# Patient Record
Sex: FEMALE | Race: WHITE | Employment: FULL TIME | URBAN - METROPOLITAN AREA
[De-identification: names, ages, dates, MRNs, and addresses within clinical notes are randomized per-mention and may not be internally consistent; named-entity substitution may affect disease eponyms.]

---

## 2017-01-25 RX ORDER — PEN NEEDLE, DIABETIC 29 G X1/2"
NEEDLE, DISPOSABLE MISCELLANEOUS
Qty: 100 PEN NEEDLE | Refills: 10 | Status: SHIPPED | OUTPATIENT
Start: 2017-01-25 | End: 2017-03-20 | Stop reason: SDUPTHER

## 2017-01-30 ENCOUNTER — PATIENT MESSAGE (OUTPATIENT)
Dept: INTERNAL MEDICINE CLINIC | Age: 57
End: 2017-01-30

## 2017-01-31 NOTE — TELEPHONE ENCOUNTER
I spoke with patient by phone- she has new insurance and new deductible requirements. Pt instructed to find out preferred coverage for DM medication and we can perscribe by their preference. Pt is not out of medication. She understands and agrees. She will notify us of the new medications after speaking with her insurance.    Aleksandr Rhodes MD

## 2017-01-31 NOTE — TELEPHONE ENCOUNTER
----- Message from Ken Simmons LPN sent at 4/87/9148  1:32 PM EST -----  Regarding: FW: Prescription Question  Contact: [phone]      ----- Message -----     From: Dasia Benavidez     Sent: 1/30/2017  12:36 PM       To: [facility]  Subject: Prescription Question                            I sent a message on Thursday (1/26/17) morning regarding my Flexeril and Levemir and would like a response to that message. The Flexeril costs me approx. $249 and the Flexeril costs me approx. $600 and I cannot afford to refill it. I expect to have a call back to day to discuss this.     [facility]  [phone]

## 2017-02-27 RX ORDER — LIRAGLUTIDE 6 MG/ML
INJECTION SUBCUTANEOUS
Qty: 4 SYRINGE | Refills: 11 | Status: SHIPPED | OUTPATIENT
Start: 2017-02-27 | End: 2017-09-11 | Stop reason: SDUPTHER

## 2017-03-20 RX ORDER — PEN NEEDLE, DIABETIC 29 G X1/2"
NEEDLE, DISPOSABLE MISCELLANEOUS
Qty: 100 PEN NEEDLE | Refills: 10 | Status: SHIPPED | OUTPATIENT
Start: 2017-03-20 | End: 2017-04-21 | Stop reason: SDUPTHER

## 2017-03-21 RX ORDER — PEN NEEDLE, DIABETIC 29 G X1/2"
NEEDLE, DISPOSABLE MISCELLANEOUS
Qty: 100 PEN NEEDLE | Refills: 99 | Status: SHIPPED | OUTPATIENT
Start: 2017-03-21 | End: 2020-09-03

## 2017-03-21 NOTE — TELEPHONE ENCOUNTER
From: Xiang Talbert  To: Phyllis Tejeda MD  Sent: 3/20/2017 4:10 PM EDT  Subject: Medication Renewal Request    Original authorizing provider: MD Xiang Moscoso would like a refill of the following medications:  Insulin Needles, Disposable, 30 gauge x 1/3\" Phyllis Tejeda MD]  Insulin [place], Disposable, 31 gauge x 1/4\" ndle Phyllis Tejeda MD]    Preferred [facility]: [facility] [address]:  The 31 gauge needles I have been using for my Levemir and Victoza and I only have a few left. The refill on this script is not refillable until around April 27, 2017. The script for the 30 gauge needles has run out and needs to redone. I am reusing needs. I need this called/send in to [address]!! I also need someone to call me once this has been done! Thank you.

## 2017-04-04 DIAGNOSIS — E11.9 TYPE 2 DIABETES MELLITUS WITHOUT COMPLICATION (HCC): ICD-10-CM

## 2017-04-05 RX ORDER — INSULIN DETEMIR 100 [IU]/ML
INJECTION, SOLUTION SUBCUTANEOUS
Qty: 4 PEN | Refills: 4 | Status: SHIPPED | OUTPATIENT
Start: 2017-04-05 | End: 2017-04-21 | Stop reason: SDUPTHER

## 2017-04-21 ENCOUNTER — OFFICE VISIT (OUTPATIENT)
Dept: ENDOCRINOLOGY | Age: 57
End: 2017-04-21

## 2017-04-21 VITALS
SYSTOLIC BLOOD PRESSURE: 124 MMHG | HEIGHT: 64 IN | HEART RATE: 89 BPM | DIASTOLIC BLOOD PRESSURE: 71 MMHG | BODY MASS INDEX: 32.1 KG/M2 | WEIGHT: 188 LBS

## 2017-04-21 DIAGNOSIS — E11.9 TYPE 2 DIABETES MELLITUS WITHOUT COMPLICATION, WITHOUT LONG-TERM CURRENT USE OF INSULIN (HCC): Primary | ICD-10-CM

## 2017-04-21 DIAGNOSIS — I10 ESSENTIAL HYPERTENSION: ICD-10-CM

## 2017-04-21 DIAGNOSIS — E78.2 MIXED HYPERLIPIDEMIA: ICD-10-CM

## 2017-04-21 RX ORDER — CANAGLIFLOZIN 100 MG/1
100 TABLET, FILM COATED ORAL DAILY
COMMUNITY
Start: 2017-04-19 | End: 2017-06-16 | Stop reason: SDUPTHER

## 2017-04-21 NOTE — PROGRESS NOTES
Chief Complaint   Patient presents with    Diabetes     History of Present Illness: Franco Lopez is a 65 y.o. female presents for evaluation of diabetes. she has had diabetes since 2005. Also had gestational diabetes around age 41. Most recent A1c was 9.4 in December 2016. Diabetes related complications:  No microalbumin  No neuropathy sx. No known retinopathy. Current diabetes regimen:  Invokana 100 mg   Metformin 1000 mg twice daily  victoza 1.8 mg daily  Levemir 70 units at night    Glucoses:  . Likely avg around 200. Diet:  Brings log where she records foods, glucoses and exercise  Exercise improves glucoses and has her lowest values  Candy and desserts and mac and cheese lead to highest glucoses. Has these kinds of foods/snacks frequently. Weight:  Wt was about 140 lbs prior to marriage in her 25s. Now weighs 188. Lipids atorvastatin 20 mg    BP: losartan 100 mg    Social;  Works for [employer].         Past Medical History:   Diagnosis Date    Diabetes ([address]     High cholesterol     Hypercholesteremia     Hypertension      Past Surgical History:   Procedure Laterality Date    HX GYN      D&C and 2 c-sections     Current Outpatient Prescriptions   Medication Sig    Insulin Needles, Disposable, 31 gauge x 1/4\" ndle Take 1-3 times daily    NOVOFINE 30 30 gauge x 1/3\" USE TO INJECT VICTOZA ONCE DAILY    Insulin Needles, Disposable, 30 gauge x 1/3\" 0.3 ml subcutaneous daily    VICTOZA 3-KIMBERLY 0.6 mg/0.1 mL (18 mg/3 mL) sub-q pen DIAL AND INJECT 1.8 MG UNDER THE SKIN ONCE DAILY    insulin detemir (LEVEMIR FLEXTOUCH) 100 unit/mL (3 mL) inpn 0.62 mL by SubCUTAneous route nightly. Increase by 2 units every 2 nights until morning sugar is < 120 (Patient taking differently: 70 Units by SubCUTAneous route nightly.  Increase by 2 units every 2 nights until morning sugar is < 120)    chlorpheniramine-acetaminophen (CORICIDIN HBP COLD AND FLU) 2-325 mg tab Per pack instructions prn cough    atorvastatin (LIPITOR) 20 mg tablet Take 1 Tab by mouth daily.  nystatin-triamcinolone (MYCOLOG II) topical cream 1 Drop. Twice daily    losartan (COZAAR) 100 mg tablet Take 1 Tab by mouth daily.  omeprazole (PRILOSEC) 40 mg capsule Take 1 Cap by mouth daily.  metFORMIN (GLUCOPHAGE) 500 mg tablet Take 2 Tabs by mouth two (2) times daily (with meals).  econazole nitrate (SPECTAZOLE) 1 % topical cream Apply  to affected area two (2) times a day.  naproxen sodium (NAPROSYN) 220 mg tablet Take 220 mg by mouth two (2) times daily (with meals).  aspirin delayed-release 81 mg tablet Take  by mouth daily. No current facility-administered medications for this visit. No Known Allergies  Family History   Problem Relation Age of Onset    Heart Disease Mother     Cancer Other      colon ca     Social History     Social History    Marital status:      Spouse name: N/A    Number of children: N/A    Years of education: N/A     Occupational History    Not on file.      Social History Main Topics    Smoking status: Never Smoker    Smokeless tobacco: Not on file    Alcohol use No    Drug use: Not on file    Sexual activity: Not on file     Other Topics Concern    Not on file     Social History Narrative     Review of Systems:  - Constitutional Symptoms: no fevers, chills, weight loss  - Eyes: no blurry vision or double vision  - Cardiovascular: no chest pain or palpitations  - Respiratory: no cough or shortness of breath  - Gastrointestinal: no dysphagia or abdominal pain  - Musculoskeletal: no joint pains or weakness  - Integumentary: no rashes  - Neurological: no numbness, tingling, or headaches  - Psychiatric: no depression or anxiety  - Endocrine: no heat or cold intolerance, no polyuria or polydipsia    Physical Examination:  Visit Vitals    /71    Pulse 89    Ht 5' 4\" (1.626 m)    Wt 188 lb (85.3 kg)    BMI 32.27 kg/m2   -   - General: pleasant, no distress, - HEENT:No scleral/conjunctival injection, EOMI,MMM  - Cardiovascular: regular, normal rate  - Respiratory: normal effort  - Integumentary: no edema  - Neurological: alert and oriented  - Psychiatric: normal mood and affect    Data Reviewed:   Component      Latest Ref Rng & Units 12/3/2016 12/1/2016 9/8/2016 3/3/2016           5:45 PM 11:36 AM  3:13 PM  3:57 PM   Sodium      136 - 145 mmol/L 138      Potassium      3.5 - 5.1 mmol/L 3.6      Chloride      97 - 108 mmol/L 103      CO2      21 - 32 mmol/L 25      Anion gap      5 - 15 mmol/L 10      Glucose      65 - 100 mg/dL 151 (H)      BUN      6 - 20 MG/DL 10      Creatinine      0.55 - 1.02 MG/DL 0.82      BUN/Creatinine ratio      12 - 20   12      GFR est AA      >60 ml/min/1.73m2 >60      GFR est non-AA      >60 ml/min/1.73m2 >60      Calcium      8.5 - 10.1 MG/DL 9.3      Bilirubin, total      0.2 - 1.0 MG/DL 0.3      ALT      12 - 78 U/L 23      AST      15 - 37 U/L 14 (L)      Alk.  phosphatase      45 - 117 U/L 116      Protein, total      6.4 - 8.2 g/dL 8.2      Albumin      3.5 - 5.0 g/dL 3.8      Globulin      2.0 - 4.0 g/dL 4.4 (H)      A-G Ratio      1.1 - 2.2   0.9 (L)      Cholesterol, total      100 - 199 mg/dL    126   Triglyceride      0 - 149 mg/dL    139   HDL Cholesterol      >39 mg/dL    34 (L)   VLDL, calculated      5 - 40 mg/dL    28   LDL, calculated      0 - 99 mg/dL    64   ALBUMIN, URINE POC      Negative mg/L       CREATININE, URINE POC      mg/dL       Microalbumin/creat ratio (POC)      mg/g       Hemoglobin A1c, (calculated)      4.8 - 5.6 %  9.4 (H)     Estimated average glucose      mg/dL  223     Hemoglobin A1c (POC)      %   8.8      Component      Latest Ref Rng & Units 12/3/2015           4:00 PM   Sodium      136 - 145 mmol/L    Potassium      3.5 - 5.1 mmol/L    Chloride      97 - 108 mmol/L    CO2      21 - 32 mmol/L    Anion gap      5 - 15 mmol/L    Glucose      65 - 100 mg/dL    BUN      6 - 20 MG/DL    Creatinine 0.55 - 1.02 MG/DL    BUN/Creatinine ratio      12 - 20      GFR est AA      >60 ml/min/1.73m2    GFR est non-AA      >60 ml/min/1.73m2    Calcium      8.5 - 10.1 MG/DL    Bilirubin, total      0.2 - 1.0 MG/DL    ALT      12 - 78 U/L    AST      15 - 37 U/L    Alk. phosphatase      45 - 117 U/L    Protein, total      6.4 - 8.2 g/dL    Albumin      3.5 - 5.0 g/dL    Globulin      2.0 - 4.0 g/dL    A-G Ratio      1.1 - 2.2      Cholesterol, total      100 - 199 mg/dL    Triglyceride      0 - 149 mg/dL    HDL Cholesterol      >39 mg/dL    VLDL, calculated      5 - 40 mg/dL    LDL, calculated      0 - 99 mg/dL    ALBUMIN, URINE POC      Negative mg/L 30   CREATININE, URINE POC      mg/dL 100   Microalbumin/creat ratio (POC)      mg/g <30     Assessment/Plan:   1. Type 2 diabetes mellitus without complication, without long-term current use of insulin (HCC)   - control is good at times, especially when she is making dietary efforts, like she was near end of January.   - Reviewed pathology of type II diabetes, including insulin resistance and progressive loss of beta cell function and insulin production with time. Explained the role of weight loss and limiting carbohydrate intake in affecting insulin needs. Reviewed basal and prandial insulin needs. Reviewed handout and gave basic directions on carbohydrate content of foods. Recommended limiting carbohydrate intake to < 45 g with meals. - she has good insight and acknowledges that her poor diet and frequent intake of sweets and starches is the main obstacle to good diabetes control  Encouraged her to avoid carbohydrates and sweets for the most part. Reviewed how her medications work. Victoza should cover a reasonable amount of carbohydrates, but she frequently exceeds moderate consumption and glucoses spike  Recommended focused monitoring to determine which meals and foods work for her and which do not. Discussed how monitoring could help Levemir dose be adjusted. 2. BMI 32.0-32.9,adult   - she is likely 40-50 lbs above optimal weight for her. As noted above, reviewed how considerable weight loss would greatly improve her diabetes control and decrease medication needs. - encouraged her to avoid the extra, empty calories she is currently consuming.  - discussed how weight loss medications could be considered in future to help her with dietary efforts  - will reassess in 3 months. 3. Essential hypertension - controlled. Continue losartan   4. Mixed hyperlipidemia - continue atorvastatin   Greater than 50% of 45 minute visit was spent counseling the patient about above    Patient Instructions   Diabetes. - Avoid candy and sweets. Watch carbohydrate intake with meals and aim to keep this less than 45 grams per meal.    A Mediterranean style diet with 55% or less of calories from carbohydrates has been shown to be very helpful for people with diabetes. This diet consists of vegetables, whole fruit, nuts, whole grains, beans, lentils, olive oil, fish, chicken, and less refined grains, red and processed meats. Exercise: work up to 30 minutes 5 days weekly of walking, or any other activity that gets your heart rate up. Make sure you are getting enough sleep - at least 7 hours/night. Medications:  Victoza 1.8 mg daily  Invokana 100 mg   Metformin 1000 mg twice daily    Levemir 70 units at night  ** Follow trend from bedtime to fasting to assess    Goals for blood glucose:  Fasting  (less than 150)  Lunch, Dinner, Bedtime -  (less than 180). ** Decrease Levemir in 5-10 unit increments every 5-7 days for values < 90.      Work on weight loss and exercise efforts as well       Return in 3 months

## 2017-04-21 NOTE — MR AVS SNAPSHOT
Visit Information Date & Time Provider Department Dept. Phone Encounter #  
 4/21/2017  1:00 PM Hallie Wagner, [facility] [phone] [id number] Upcoming Health Maintenance Date Due  
 EYE EXAM RETINAL OR DILATED Q1 3/2/1971 DTaP/Tdap/Td series (1 - Tdap) 3/2/1982 BREAST CANCER SCRN MAMMOGRAM 3/2/2011 INFLUENZA AGE 9 TO ADULT 8/1/2016 FOOT EXAM Q1 12/3/2016 MICROALBUMIN Q1 12/3/2016 LIPID PANEL Q1 3/3/2017 FOBT Q 1 YEAR AGE 50-75 3/4/2017 HEMOGLOBIN A1C Q6M 6/1/2017 PAP AKA CERVICAL CYTOLOGY 3/2/2018 Allergies as of 4/21/2017  Review Complete On: 4/21/2017 By: Hallie Wagner MD  
 No Known Allergies Current Immunizations  Never Reviewed Name Date Influenza Vaccine 10/9/2014 Pneumococcal Vaccine (Unspecified Type) 10/9/2014 Not reviewed this visit Vitals BP Pulse Height(growth percentile) Weight(growth percentile) BMI OB Status 124/71 89 5' 4\" (1.626 m) 188 lb (85.3 kg) 32.27 kg/m2 Menopause Smoking Status Never Smoker Vitals History BMI and BSA Data Body Mass Index Body Surface Area  
 32.27 kg/m 2 1.96 m 2 Preferred Pharmacy Pharmacy Name Phone [facility] [address] [facility] [address] [facility] [phone] Your Updated Medication List  
  
   
This list is accurate as of: 4/21/17  1:56 PM.  Always use your most recent med list.  
  
  
  
  
 atorvastatin 20 mg tablet Commonly known as:  LIPITOR Take 1 Tab by mouth daily. chlorpheniramine-acetaminophen 2-325 mg Tab Commonly known as:  CORICIDIN HBP COLD AND FLU Per pack instructions prn cough  
  
 econazole nitrate 1 % topical cream  
Commonly known as:  Mitzi Brew Apply  to affected area two (2) times a day. insulin detemir 100 unit/mL (3 mL) Inpn Commonly known as:  Nolia Runner  
 0.62 mL by SubCUTAneous route nightly. Increase by 2 units every 2 nights until morning sugar is < 120 INVOKANA 100 mg tablet Generic drug:  canagliflozin Take 100 mg by mouth daily. losartan 100 mg tablet Commonly known as:  COZAAR Take 1 Tab by mouth daily. metFORMIN 500 mg tablet Commonly known as:  GLUCOPHAGE Take 2 Tabs by mouth two (2) times daily (with meals). naproxen sodium 220 mg tablet Commonly known as:  NAPROSYN Take 220 mg by mouth two (2) times daily (with meals). * NOVOFINE 30 30 gauge x 1/3\" Generic drug:  Insulin Needles (Disposable) USE TO INJECT VICTOZA ONCE DAILY * Insulin Needles (Disposable) 30 gauge x 1/3\"  
0.3 ml subcutaneous daily * Insulin Needles (Disposable) 31 gauge x 1/4\" Ndle Take 1-3 times daily  
  
 nystatin-triamcinolone topical cream  
Commonly known as:  MYCOLOG II  
1 Drop. Twice daily  
  
 omeprazole 40 mg capsule Commonly known as:  PRILOSEC Take 1 Cap by mouth daily. VICTOZA 3-KIMBERLY 0.6 mg/0.1 mL (18 mg/3 mL) sub-q pen Generic drug:  Liraglutide DIAL AND INJECT 1.8 MG UNDER THE SKIN ONCE DAILY * Notice: This list has 3 medication(s) that are the same as other medications prescribed for you. Read the directions carefully, and ask your doctor or other care provider to review them with you. Patient Instructions Diabetes. - Avoid candy and sweets. Watch carbohydrate intake with meals and aim to keep this less than 45 grams per meal. 
 
A Mediterranean style diet with 55% or less of calories from carbohydrates has been shown to be very helpful for people with diabetes. This diet consists of vegetables, whole fruit, nuts, whole grains, beans, lentils, olive oil, fish, chicken, and less refined grains, red and processed meats. Exercise: work up to 30 minutes 5 days weekly of walking, or any other activity that gets your heart rate up. Make sure you are getting enough sleep - at least 7 hours/night. Medications: 
Victoza 1.8 mg daily Invokana 100 mg  
Metformin 1000 mg twice daily Levemir 70 units at night 
** Follow trend from bedtime to fasting to assess Goals for blood glucose: 
Fasting  (less than 150) Lunch, Dinner, Bedtime -  (less than 180). ** Decrease Levemir in 5-10 unit increments every 5-7 days for values < 90. Work on weight loss and exercise efforts as well Introducing [facility]! Dear Yadira Mancia: Thank you for requesting a [employer] account. Our records indicate that you already have an active [employer] account. You can access your account anytime at [email or web address] "[employer] Did you know that you can access your hospital and ER discharge instructions at any time in [employer]? You can also review all of your test results from your hospital stay or ER visit. Additional Information If you have questions, please visit the Frequently Asked Questions section of the [employer] website at [email or web address] "[employer]/. Remember, [employer] is NOT to be used for urgent needs. For medical emergencies, dial 911. Now available from your iPhone and Android! Please provide this summary of care documentation to your next provider. Your primary care clinician is listed as Huy Moffett. If you have any questions after today's visit, please call [phone].

## 2017-04-21 NOTE — PATIENT INSTRUCTIONS
Diabetes. - Avoid candy and sweets. Watch carbohydrate intake with meals and aim to keep this less than 45 grams per meal.    A Mediterranean style diet with 55% or less of calories from carbohydrates has been shown to be very helpful for people with diabetes. This diet consists of vegetables, whole fruit, nuts, whole grains, beans, lentils, olive oil, fish, chicken, and less refined grains, red and processed meats. Exercise: work up to 30 minutes 5 days weekly of walking, or any other activity that gets your heart rate up. Make sure you are getting enough sleep - at least 7 hours/night. Medications:  Victoza 1.8 mg daily  Invokana 100 mg   Metformin 1000 mg twice daily    Levemir 70 units at night  ** Follow trend from bedtime to fasting to assess    Goals for blood glucose:  Fasting  (less than 150)  Lunch, Dinner, Bedtime -  (less than 180). ** Decrease Levemir in 5-10 unit increments every 5-7 days for values < 90.      Work on weight loss and exercise efforts as well

## 2017-06-16 RX ORDER — CANAGLIFLOZIN 100 MG/1
100 TABLET, FILM COATED ORAL DAILY
Qty: 90 TAB | Refills: 3 | Status: SHIPPED | OUTPATIENT
Start: 2017-06-16 | End: 2017-08-03 | Stop reason: ALTCHOICE

## 2017-07-01 DIAGNOSIS — I15.0 RENOVASCULAR HYPERTENSION: ICD-10-CM

## 2017-07-03 DIAGNOSIS — E11.9 TYPE 2 DIABETES MELLITUS WITHOUT COMPLICATION, UNSPECIFIED LONG TERM INSULIN USE STATUS: ICD-10-CM

## 2017-07-03 RX ORDER — METFORMIN HYDROCHLORIDE 500 MG/1
1000 TABLET ORAL 2 TIMES DAILY WITH MEALS
Qty: 360 TAB | Refills: 12 | Status: SHIPPED | OUTPATIENT
Start: 2017-07-03 | End: 2017-08-03 | Stop reason: ALTCHOICE

## 2017-07-03 RX ORDER — LOSARTAN POTASSIUM 100 MG/1
TABLET ORAL
Qty: 30 TAB | Refills: 3 | Status: SHIPPED | OUTPATIENT
Start: 2017-07-03

## 2017-07-30 ENCOUNTER — APPOINTMENT (OUTPATIENT)
Dept: GENERAL RADIOLOGY | Age: 57
End: 2017-07-30
Attending: EMERGENCY MEDICINE
Payer: COMMERCIAL

## 2017-07-30 ENCOUNTER — HOSPITAL ENCOUNTER (EMERGENCY)
Age: 57
Discharge: HOME | End: 2017-07-30
Attending: EMERGENCY MEDICINE
Payer: COMMERCIAL

## 2017-07-30 VITALS
RESPIRATION RATE: 14 BRPM | HEIGHT: 64 IN | WEIGHT: 178 LBS | BODY MASS INDEX: 30.39 KG/M2 | HEART RATE: 71 BPM | DIASTOLIC BLOOD PRESSURE: 80 MMHG | TEMPERATURE: 97.6 F | OXYGEN SATURATION: 100 % | SYSTOLIC BLOOD PRESSURE: 165 MMHG

## 2017-07-30 DIAGNOSIS — M65.9 TENOSYNOVITIS: Primary | ICD-10-CM

## 2017-07-30 PROCEDURE — 99283 EMERGENCY DEPT VISIT LOW MDM: CPT

## 2017-07-30 PROCEDURE — L3809 WHFO W/O JOINTS PRE OTS: HCPCS

## 2017-07-30 PROCEDURE — 73110 X-RAY EXAM OF WRIST: CPT

## 2017-07-30 RX ORDER — NAPROXEN 500 MG/1
500 TABLET ORAL 2 TIMES DAILY WITH MEALS
Qty: 20 TAB | Refills: 0 | Status: SHIPPED | OUTPATIENT
Start: 2017-07-30 | End: 2017-08-03

## 2017-07-30 NOTE — ED PROVIDER NOTES
HPI Comments: 65 y.o. female with past medical history significant for diabetes, HTN, hypercholesterolemia, high cholesterol, and hyperlipidemia who presents from home with chief complaint of hand pain. Patient states that she has had pain radiating from her left fifth finger down to her wrist. She also complains of some swelling to her left wrist. Patient states that the pain is exacerbated with exertion, especially typing. Patient reports that she has had this pain since Thursday (2 days ago). She denies any injury to the area or previous history of similar pain. There are no other acute medical concerns at this time. Social hx: former smoker, no EtOH use, no drug use  PCP: Cecile Mckeon MD    Note written by Nancy Good, as dictated by Muriel Ferrara MD 9:54 AM      The history is provided by the patient. No  was used. Past Medical History:   Diagnosis Date    Diabetes ([address]     High cholesterol     Hypercholesteremia     Hypertension     Ill-defined condition     hyperlipidemia       Past Surgical History:   Procedure Laterality Date    HX GYN      D&C and 2 c-sections         Family History:   Problem Relation Age of Onset    Heart Disease Mother      had CHF    Cancer Other      colon ca    Diabetes Brother        Social History     Social History    Marital status:      Spouse name: N/A    Number of children: N/A    Years of education: N/A     Occupational History    Not on file. Social History Main Topics    Smoking status: Former Smoker    Smokeless tobacco: Not on file    Alcohol use No    Drug use: No    Sexual activity: Not on file     Other Topics Concern    Not on file     Social History Narrative         ALLERGIES: Review of patient's allergies indicates no known allergies. Review of Systems   Constitutional: Negative for activity change, appetite change and fatigue.    HENT: Negative for ear pain, facial swelling, sore throat and trouble swallowing. Eyes: Negative for pain, discharge and visual disturbance. Respiratory: Negative for chest tightness, shortness of breath and wheezing. Cardiovascular: Negative for chest pain and palpitations. Gastrointestinal: Negative for abdominal pain, blood in stool, nausea and vomiting. Genitourinary: Negative for difficulty urinating, flank pain and hematuria. Musculoskeletal: Positive for arthralgias and joint swelling. Negative for myalgias and neck pain. Skin: Negative for color change and rash. Neurological: Negative for dizziness, weakness, numbness and headaches. Hematological: Negative for adenopathy. Does not bruise/bleed easily. Psychiatric/Behavioral: Negative for behavioral problems, confusion and sleep disturbance. All other systems reviewed and are negative. Vitals:    07/30/17 0934   BP: 165/80   Pulse: 71   Resp: 14   Temp: 97.6 °F (36.4 °C)   SpO2: 100%   Weight: 80.7 kg (178 lb)   Height: 5' 4\" (1.626 m)            Physical Exam   Constitutional: She is oriented to person, place, and time. She appears well-developed and well-nourished. No distress. HENT:   Head: Normocephalic and atraumatic. Nose: Nose normal.   Mouth/Throat: Oropharynx is clear and moist.   Eyes: Conjunctivae and EOM are normal. Pupils are equal, round, and reactive to light. No scleral icterus. Neck: Normal range of motion. Neck supple. No JVD present. No tracheal deviation present. No thyromegaly present. No carotid bruits noted. Cardiovascular: Normal rate, regular rhythm, normal heart sounds and intact distal pulses. Exam reveals no gallop and no friction rub. No murmur heard. Pulmonary/Chest: Effort normal and breath sounds normal. No respiratory distress. She has no wheezes. She has no rales. She exhibits no tenderness. Abdominal: Soft. Bowel sounds are normal. She exhibits no distension and no mass. There is no tenderness.  There is no rebound and no guarding. Musculoskeletal: Normal range of motion. She exhibits no edema. Left wrist: She exhibits swelling. Left hand: She exhibits tenderness (from base of fifth metacarpal, going up to 5th finger). Lymphadenopathy:     She has no cervical adenopathy. Neurological: She is alert and oriented to person, place, and time. She has normal reflexes. No cranial nerve deficit. Coordination normal.   Skin: Skin is warm and dry. No rash noted. No erythema. Psychiatric: She has a normal mood and affect. Her behavior is normal. Judgment and thought content normal.   Nursing note and vitals reviewed. Note written by Nancy Schmitt, as dictated by Rafaela Prado MD 9:54 AM    MDM  Number of Diagnoses or Management Options     Amount and/or Complexity of Data Reviewed  Tests in the radiology section of CPT®: ordered and reviewed  Decide to obtain previous medical records or to obtain history from someone other than the patient: yes  Review and summarize past medical records: yes  Independent visualization of images, tracings, or specimens: yes    Risk of Complications, Morbidity, and/or Mortality  Presenting problems: moderate  Diagnostic procedures: moderate  Management options: moderate    Patient Progress  Patient progress: stable    ED Course       Procedures  PROGRESS NOTE:  10:20 AM Will place patients left wrist in splint and instruct patient to use warm compresses. Patient will follow up with PCP if pain does not improve in 3-4 days. Suspect this is a tenosynovitis.

## 2017-07-30 NOTE — LETTER
[address] 
[address] [facility] [id number] 
[phone] Work/School Note Date: 7/30/2017 To Whom It May concern: 
 
Elif Short was seen and treated today in the emergency room by the following provider(s): 
Attending Provider: Jeannette Álvarez MD. Elif Short Return to work 8/2/17 Sincerely, Jeannette Álvarez MD

## 2017-08-03 ENCOUNTER — OFFICE VISIT (OUTPATIENT)
Dept: ENDOCRINOLOGY | Age: 57
End: 2017-08-03

## 2017-08-03 VITALS
BODY MASS INDEX: 30.39 KG/M2 | DIASTOLIC BLOOD PRESSURE: 79 MMHG | WEIGHT: 178 LBS | HEIGHT: 64 IN | HEART RATE: 78 BPM | SYSTOLIC BLOOD PRESSURE: 137 MMHG

## 2017-08-03 DIAGNOSIS — E11.9 TYPE 2 DIABETES MELLITUS WITHOUT COMPLICATION, UNSPECIFIED LONG TERM INSULIN USE STATUS: ICD-10-CM

## 2017-08-03 DIAGNOSIS — E11.9 TYPE 2 DIABETES MELLITUS WITHOUT COMPLICATION, WITH LONG-TERM CURRENT USE OF INSULIN (HCC): Primary | ICD-10-CM

## 2017-08-03 DIAGNOSIS — Z79.4 TYPE 2 DIABETES MELLITUS WITHOUT COMPLICATION, WITH LONG-TERM CURRENT USE OF INSULIN (HCC): Primary | ICD-10-CM

## 2017-08-03 DIAGNOSIS — I10 ESSENTIAL HYPERTENSION: ICD-10-CM

## 2017-08-03 DIAGNOSIS — E78.2 MIXED HYPERLIPIDEMIA: ICD-10-CM

## 2017-08-03 NOTE — MR AVS SNAPSHOT
Visit Information Date & Time Provider Department Dept. Phone Encounter #  
 8/3/2017  2:30 PM Rory Maddox, [facility] [phone] Follow-up Instructions Return in about 4 months (around 12/3/2017). Upcoming Health Maintenance Date Due  
 EYE EXAM RETINAL OR DILATED Q1 3/2/1971 DTaP/Tdap/Td series (1 - Tdap) 3/2/1982 BREAST CANCER SCRN MAMMOGRAM 3/2/2011 FOOT EXAM Q1 12/3/2016 FOBT Q 1 YEAR AGE 50-75 3/4/2017 HEMOGLOBIN A1C Q6M 6/1/2017 INFLUENZA AGE 9 TO ADULT 8/1/2017 PAP AKA CERVICAL CYTOLOGY 3/2/2018 MICROALBUMIN Q1 6/8/2018 LIPID PANEL Q1 6/8/2018 Allergies as of 8/3/2017  Review Complete On: 8/3/2017 By: Rory Maddox MD  
 No Known Allergies Current Immunizations  Never Reviewed Name Date Influenza Vaccine 10/9/2014 Pneumococcal Vaccine (Unspecified Type) 10/9/2014 Not reviewed this visit You Were Diagnosed With   
  
 Codes Comments Type 2 diabetes mellitus without complication, with long-term current use of insulin (HCC)    -  Primary ICD-10-CM: E11.9, Z79.4 ICD-9-CM: 250.00, V58.67 Essential hypertension     ICD-10-CM: I10 
ICD-9-CM: 401.9 Mixed hyperlipidemia     ICD-10-CM: E78.2 ICD-9-CM: 272.2 BMI 30.0-30.9,adult     ICD-10-CM: Z68.30 ICD-9-CM: V85.30 Vitals BP Pulse Height(growth percentile) Weight(growth percentile) BMI OB Status 137/79 78 5' 4\" (1.626 m) 178 lb (80.7 kg) 30.55 kg/m2 Menopause Smoking Status Former Smoker Vitals History BMI and BSA Data Body Mass Index Body Surface Area 30.55 kg/m 2 1.91 m 2 Preferred Pharmacy Pharmacy Name Phone [facility] [address] [place] [address] [address] [facility] [phone] Your Updated Medication List  
  
   
This list is accurate as of: 8/3/17  3:36 PM.  Always use your most recent med list.  
  
  
  
  
 atorvastatin 20 mg tablet Commonly known as:  LIPITOR Take 1 Tab by mouth daily. canagliflozin-metformin 150-1,000 mg Tbph  
Commonly known as:  INVOKAMET XR Take 2 Each by mouth daily. econazole nitrate 1 % topical cream  
Commonly known as:  Nataly Meals Apply  to affected area two (2) times a day. insulin detemir 100 unit/mL (3 mL) Inpn Commonly known as:  LEVEMIR FLEXTOUCH  
0.62 mL by SubCUTAneous route nightly. Increase by 2 units every 2 nights until morning sugar is < 120  
  
 losartan 100 mg tablet Commonly known as:  COZAAR  
TAKE ONE TABLET BY MOUTH DAILY  
  
 naproxen sodium 220 mg tablet Commonly known as:  NAPROSYN Take 220 mg by mouth two (2) times daily (with meals). * NOVOFINE 30 30 gauge x 1/3\" Generic drug:  Insulin Needles (Disposable) USE TO INJECT VICTOZA ONCE DAILY * Insulin Needles (Disposable) 30 gauge x 1/3\"  
0.3 ml subcutaneous daily * Insulin Needles (Disposable) 31 gauge x 1/4\" Ndle Take 1-3 times daily  
  
 nystatin-triamcinolone topical cream  
Commonly known as:  MYCOLOG II  
1 Drop. Twice daily  
  
 omeprazole 40 mg capsule Commonly known as:  PRILOSEC Take 1 Cap by mouth daily. phentermine-topiramate 7.5-46 mg Cm24 Commonly known as:  QSYMIA Take 1 Each by mouth daily. Max Daily Amount: 1 Each. VICTOZA 3-KIMBERLY 0.6 mg/0.1 mL (18 mg/3 mL) sub-q pen Generic drug:  Liraglutide DIAL AND INJECT 1.8 MG UNDER THE SKIN ONCE DAILY * Notice: This list has 3 medication(s) that are the same as other medications prescribed for you. Read the directions carefully, and ask your doctor or other care provider to review them with you. Prescriptions Printed Refills  
 phentermine-topiramate (QSYMIA) 7.5-46 mg CM24 5 Sig: Take 1 Each by mouth daily. Max Daily Amount: 1 Each. Class: Print Route: Oral  
  
Prescriptions Sent to Pharmacy Refills canagliflozin-metformin (INVOKAMET XR) 150-1,000 mg TBph 10 Sig: Take 2 Each by mouth daily. Class: Normal  
 Pharmacy: [facility] Michael Morales [facility] Ph #: [phone] Route: Oral  
  
Follow-up Instructions Return in about 4 months (around 12/3/2017). Patient Instructions Diabetes. Continue dietary, exercise and weight loss efforts Watch carbohydrate intake with meals and aim to keep this less than 45 grams per meal. 
 
Medications: 
Victoza 1.8 mg daily Combine metformin and Invokana as Invokamet /1000 x 2 once daily Levemir 65 +/- units at night 
** Follow trend from bedtime to fasting to assess ** should need less if your diet improves and you lose weight. Goals for blood glucose: 
Fasting  (less than 150) Lunch, Dinner, Bedtime -  (less than 180). ** Decrease Levemir in 5-10 unit increments every 5-7 days for values < 90.  
 
Weight: - Qsymia 7.5 mg/46 mg.  IF you would like, we can increase to 11mg dose in 1 month. Highest dose is 15 mg daily/96 mg dose Potential side effects:  
Phentermine - Take early in the morning. Let me know if you have any problems with increase heart rate, blood pressure, anxiety or trouble sleeping as these can be side effects. If symptoms are mild, you body should acclimate to this and any related symptoms should improve. 
  
Topiramate - Side effect may be sleepiness. Also, if you notice any changes in your thinking, then decrease medication or stop. Let me know if you have questions Introducing [facility]! Dear Riccardo Ferro: Thank you for requesting a nWay account. Our records indicate that you already have an active nWay account. You can access your account anytime at [email or web address] Did you know that you can access your hospital and ER discharge instructions at any time in nWay?   You can also review all of your test results from your hospital stay or ER visit. Additional Information If you have questions, please visit the Frequently Asked Questions section of the [employer] website at [email or web address] Remember, [employer] is NOT to be used for urgent needs. For medical emergencies, dial 911. Now available from your iPhone and Android! Please provide this summary of care documentation to your next provider. Your primary care clinician is listed as Alexander Fitch. If you have any questions after today's visit, please call [phone].

## 2017-08-03 NOTE — PATIENT INSTRUCTIONS
Diabetes. Continue dietary, exercise and weight loss efforts  Watch carbohydrate intake with meals and aim to keep this less than 45 grams per meal.    Medications:  Victoza 1.8 mg daily  Combine metformin and Invokana as Invokamet /1000 x 2 once daily    Levemir 65 +/- units at night  ** Follow trend from bedtime to fasting to assess  ** should need less if your diet improves and you lose weight. Goals for blood glucose:  Fasting  (less than 150)  Lunch, Dinner, Bedtime -  (less than 180). ** Decrease Levemir in 5-10 unit increments every 5-7 days for values < 90.     Weight:  - Qsymia 7.5 mg/46 mg.  IF you would like, we can increase to 11mg dose in 1 month. Highest dose is 15 mg daily/96 mg dose    Potential side effects:   Phentermine -   Take early in the morning. Let me know if you have any problems with increase heart rate, blood pressure, anxiety or trouble sleeping as these can be side effects. If symptoms are mild, you body should acclimate to this and any related symptoms should improve.     Topiramate -   Side effect may be sleepiness. Also, if you notice any changes in your thinking, then decrease medication or stop.    Let me know if you have questions

## 2017-08-03 NOTE — PROGRESS NOTES
History of Present Illness: Dann Fields is a 65 y.o. female presents for follow-up of diabetes. she has had diabetes since 2005. Also had gestational diabetes around age 41. She also has dyslipidemia, HTN and BMI of 30. Diabetes related complications:  No microalbumin  No neuropathy sx. No known retinopathy. Current diabetes regimen:  Invokana 100 mg   Metformin 1000 mg twice daily  victoza 1.8 mg daily  Levemir -taking 65 units +/- in evenings. Weight is down about 10 lbs from 2017 visit. A1c was 8.8 in 2017, down from 9.4 in 2016    Glucoses:  Brings meter: Av at breakfast, 139 at lunch, 173 at dinner    Diet:  Dinner and fasting values are variable and can be high if she has too much starch for lunch or dinner, respectively. Weight:  Wt was about 140 lbs prior to marriage in her 25s, was as high as 190s and is now 178, down from 188 lb at last visit. Lipids atorvastatin 20 mg    BP: losartan 100 mg. 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       Past Medical History:   Diagnosis Date    Diabetes ([address]     High cholesterol     Hypercholesteremia     Hypertension     Ill-defined condition     hyperlipidemia     Current Outpatient Prescriptions   Medication Sig    losartan (COZAAR) 100 mg tablet TAKE ONE TABLET BY MOUTH DAILY    metFORMIN (GLUCOPHAGE) 500 mg tablet Take 2 Tabs by mouth two (2) times daily (with meals).  INVOKANA 100 mg tablet Take 1 Tab by mouth daily.  Insulin Needles, Disposable, 31 gauge x 1/4\" ndle Take 1-3 times daily    NOVOFINE 30 30 gauge x 1/3\" USE TO INJECT VICTOZA ONCE DAILY    Insulin Needles, Disposable, 30 gauge x 1/3\" 0.3 ml subcutaneous daily    VICTOZA 3-KIMBERLY 0.6 mg/0.1 mL (18 mg/3 mL) sub-q pen DIAL AND INJECT 1.8 MG UNDER THE SKIN ONCE DAILY    insulin detemir (LEVEMIR FLEXTOUCH) 100 unit/mL (3 mL) inpn 0.62 mL by SubCUTAneous route nightly.  Increase by 2 units every 2 nights until morning sugar is < 120 (Patient taking differently: 70 Units by SubCUTAneous route nightly. Increase by 2 units every 2 nights until morning sugar is < 120)    atorvastatin (LIPITOR) 20 mg tablet Take 1 Tab by mouth daily.  nystatin-triamcinolone (MYCOLOG II) topical cream 1 Drop. Twice daily    omeprazole (PRILOSEC) 40 mg capsule Take 1 Cap by mouth daily.  econazole nitrate (SPECTAZOLE) 1 % topical cream Apply  to affected area two (2) times a day.  naproxen sodium (NAPROSYN) 220 mg tablet Take 220 mg by mouth two (2) times daily (with meals).  naproxen (NAPROSYN) 500 mg tablet Take 1 Tab by mouth two (2) times daily (with meals) for 10 days.  chlorpheniramine-acetaminophen (CORICIDIN HBP COLD AND FLU) 2-325 mg tab Per pack instructions prn cough     No current facility-administered medications for this visit. No Known Allergies    Review of Systems:  - Eyes: no blurry vision or double vision  - Cardiovascular: no chest pain  - Respiratory: no shortness of breath  - Musculoskeletal: no myalgias  - Neurological: no numbness/tingling in extremities    Physical Examination:  Visit Vitals    /79    Pulse 78    Ht 5' 4\" (1.626 m)    Wt 178 lb (80.7 kg)    BMI 30.55 kg/m2   -   - General: pleasant, no distress, normal gait   HEENT: hearing intact, EOMI, clear sclera without icterus  - Cardiovascular: regular, normal rate   - Respiratory: normal effort  - Integumentary: no edema  - Psychiatric: normal mood and affect    Data Reviewed:   Labs from 6/8/2017  A1c 8.8  Creatinine 0.7, GFR > 60, AST and ALT normal  Chol 145, triglycerides 186, HDL 36, LDL 72  Microalbumin/creatinine ratio: 13    Assessment/Plan:   1. Type 2 diabetes mellitus without complication, with long-term current use of insulin ([address]   - resent control is indicative of A1c of about 7.3, so overall control is much better. Still has some spikes when she eats too much starch or sweets.   - Discussed how pioglitazone would likely be a good fit for her. Will consider this at next visit. Will try a weight loss medication for the time being to see about further improving dietary efforts and weight  - continue Levemir 65 units +/- and decrease in 5-10 unit increments for values < 90  - change Invokana + metformin to Invokamet /1000 x 2 daily     2. Essential hypertension   - acceptable control  - continue losartan   3. Mixed hyperlipidemia   - continue atorvastatin  - weight loss and/or pioglitazone would help lower triglycerides and raise HDL   4. BMI 30.0-30.9,adult   - Exacerbating diabetes, HTN, dyslipidemia  - will have her start Qsymia 7.5/46 mg tablet once daily. Reviewed how this works, discussed individual components and reviewed side effects. If tolerating, can increase to 11.25 mg dose in 1 month     Patient Instructions   Diabetes. Continue dietary, exercise and weight loss efforts  Watch carbohydrate intake with meals and aim to keep this less than 45 grams per meal.    Medications:  Victoza 1.8 mg daily  Combine metformin and Invokana as Invokamet /1000 x 2 once daily    Levemir 65 +/- units at night  ** Follow trend from bedtime to fasting to assess  ** should need less if your diet improves and you lose weight. Goals for blood glucose:  Fasting  (less than 150)  Lunch, Dinner, Bedtime -  (less than 180). ** Decrease Levemir in 5-10 unit increments every 5-7 days for values < 90.     Weight:  - Qsymia 7.5 mg/46 mg.  IF you would like, we can increase to 11mg dose in 1 month. Highest dose is 15 mg daily/96 mg dose    Potential side effects:   Phentermine -   Take early in the morning. Let me know if you have any problems with increase heart rate, blood pressure, anxiety or trouble sleeping as these can be side effects. If symptoms are mild, you body should acclimate to this and any related symptoms should improve.     Topiramate -   Side effect may be sleepiness.  Also, if you notice any changes in your thinking, then decrease medication or stop. Let me know if you have questions           Follow-up Disposition:  Return in about 4 months (around 12/3/2017).     Copy sent to:

## 2017-08-08 ENCOUNTER — TELEPHONE (OUTPATIENT)
Dept: ENDOCRINOLOGY | Age: 57
End: 2017-08-08

## 2017-08-08 NOTE — TELEPHONE ENCOUNTER
8/8/2017  11:42 AM      Ms.  Leander Marlon called stating that a prior authorization is needed for     -phentermine-topiramate (QSYMIA) 7.5-46 mg       Thanks

## 2017-08-14 ENCOUNTER — TELEPHONE (OUTPATIENT)
Dept: ENDOCRINOLOGY | Age: 57
End: 2017-08-14

## 2017-08-14 RX ORDER — TOPIRAMATE 25 MG/1
25 TABLET ORAL 2 TIMES DAILY
Qty: 60 TAB | Refills: 10 | Status: SHIPPED | OUTPATIENT
Start: 2017-08-14 | End: 2017-12-14

## 2017-08-14 NOTE — TELEPHONE ENCOUNTER
Phentermine 8 mg =  Lomaira . This was released last year. It is branded, but somewhat inexpensive - $0.5 per pill. I have other patients taking this.  It should be available    Please see if she can look this up as Lomaira 8 mg tablet  Thank you

## 2017-08-14 NOTE — TELEPHONE ENCOUNTER
Tara Martin from [address] [place] [address] called in regards to Ms. Mcdonald medication (Phentermine) Tara Martin says that they dont make (Phentermine) 8mg anymore and would like to know what to do next.  You can reach Abena at [phone]

## 2017-09-11 RX ORDER — LIRAGLUTIDE 6 MG/ML
INJECTION SUBCUTANEOUS
Qty: 3 ADJUSTABLE DOSE PRE-FILLED PEN SYRINGE | Refills: 2 | Status: SHIPPED | OUTPATIENT
Start: 2017-09-11 | End: 2017-09-11 | Stop reason: SDUPTHER

## 2017-12-14 ENCOUNTER — OFFICE VISIT (OUTPATIENT)
Dept: ENDOCRINOLOGY | Age: 57
End: 2017-12-14

## 2017-12-14 VITALS
SYSTOLIC BLOOD PRESSURE: 143 MMHG | HEART RATE: 95 BPM | BODY MASS INDEX: 30.39 KG/M2 | DIASTOLIC BLOOD PRESSURE: 77 MMHG | HEIGHT: 64 IN | WEIGHT: 178 LBS

## 2017-12-14 DIAGNOSIS — E11.9 TYPE 2 DIABETES MELLITUS WITHOUT COMPLICATION, WITH LONG-TERM CURRENT USE OF INSULIN (HCC): Primary | ICD-10-CM

## 2017-12-14 DIAGNOSIS — I10 ESSENTIAL HYPERTENSION: ICD-10-CM

## 2017-12-14 DIAGNOSIS — E78.2 MIXED HYPERLIPIDEMIA: ICD-10-CM

## 2017-12-14 DIAGNOSIS — Z79.4 TYPE 2 DIABETES MELLITUS WITHOUT COMPLICATION, WITH LONG-TERM CURRENT USE OF INSULIN (HCC): Primary | ICD-10-CM

## 2017-12-14 NOTE — PROGRESS NOTES
History of Present Illness: Nanci Jeter is a 65 y.o. female presents for follow-up of diabetes. she has had diabetes since 2005. Also had gestational diabetes around age 41. She also has dyslipidemia, HTN and BMI of 30. Diabetes related complications:  No microalbumin  No neuropathy sx. No known retinopathy. Current diabetes regimen:  Invokana 100 mg   Metformin 1000 mg twice daily  victoza 1.8 mg daily  Levemir -taking 65 units  in evenings. -*She will be losing her insurance at the end of the year. She will likely apply for patient assistance for these expensive medications. Weight is down about 10 lbs from 4/2017 visit and has been stable since. A1c is increased to 9.4.,  This is up from 8.8 in June 2017, and similar to 9.4 in 12/2016    Glucoses:  Glucose meter reviewed. She has checking it at varying times-before breakfast, lunch and dinner. She infrequently checks before dinner. Fasting values can range from 104 to 300s. Values in the middle of the day and evening are often in the mid 100s. Diet:  Eating more sweets. Weight: Stable from last visit. BMI is 30.5 and weight is about 40 pounds higher than it was when she was in her 25s. She seems motivated to make dietary changes. Her daughter has lost 50 pounds with dietary changes. Her daughter is home for [employer] vacation. Mrs. Geraldo Tesfaye asks about the [employer]. Lipids atorvastatin 20 mg    BP: losartan 100 mg. BP higher today. She does not monitor at home. Social;  Works for periodontist.       Past Medical History:   Diagnosis Date    Diabetes ([address]     High cholesterol     Hypercholesteremia     Hypertension     Ill-defined condition     hyperlipidemia     Current Outpatient Prescriptions   Medication Sig    Liraglutide (VICTOZA 3-KIMBERLY) 0.6 mg/0.1 mL (18 mg/3 mL) pnij 1.8 mg by SubCUTAneous route daily.     canagliflozin-metformin (INVOKAMET XR) 150-1,000 mg TBph Take 2 Each by mouth daily. (Patient taking differently: Take 1 Each by mouth daily.)    insulin detemir (LEVEMIR FLEXTOUCH) 100 unit/mL (3 mL) inpn 70 Units by SubCUTAneous route nightly.  losartan (COZAAR) 100 mg tablet TAKE ONE TABLET BY MOUTH DAILY    Insulin Needles, Disposable, 31 gauge x 1/4\" ndle Take 1-3 times daily    NOVOFINE 30 30 gauge x 1/3\" USE TO INJECT VICTOZA ONCE DAILY    Insulin Needles, Disposable, 30 gauge x 1/3\" 0.3 ml subcutaneous daily    atorvastatin (LIPITOR) 20 mg tablet Take 1 Tab by mouth daily.  omeprazole (PRILOSEC) 40 mg capsule Take 1 Cap by mouth daily.  econazole nitrate (SPECTAZOLE) 1 % topical cream Apply  to affected area two (2) times a day.  phentermine 8 mg tab Take 16 mg by mouth daily. Max Daily Amount: 16 mg. Increase dose gradually as directed    topiramate (TOPAMAX) 25 mg tablet Take 1 Tab by mouth two (2) times a day.  nystatin-triamcinolone (MYCOLOG II) topical cream 1 Drop. Twice daily    naproxen sodium (NAPROSYN) 220 mg tablet Take 220 mg by mouth two (2) times daily (with meals). No current facility-administered medications for this visit. No Known Allergies    Review of Systems:  - Eyes: no blurry vision or double vision  - Cardiovascular: no chest pain  - Respiratory: no shortness of breath  - Musculoskeletal: no myalgias  - Neurological: no numbness/tingling in extremities. She does experience some pain in her left arch.     Physical Examination:  Visit Vitals    /77    Pulse 95    Ht 5' 4\" (1.626 m)    Wt 178 lb (80.7 kg)    BMI 30.55 kg/m2   -   - General: pleasant, no distress, normal gait   HEENT: hearing intact, EOMI, clear sclera without icterus  - Cardiovascular: regular, normal rate   - Respiratory: normal effort  - Integumentary: no edema   Diabetic foot exam:     Left: Filament test normal sensation with micro filament   Pulse DP: 2+ (normal)   Deformities: Mild -flatfeet  - Psychiatric: normal mood and affect    Data Reviewed: CMP normal  Hemoglobin A1c 9.4  Cholesterol 134, triglycerides 111, HDL 43, LDL 69    Assessment/Plan:   1. Type 2 diabetes mellitus without complication, with long-term current use of insulin (HCC)   Not controlled. She admits this is largely due to not adhering to her diet. She does ask about the diabetes reversal diet. I shared with her a handout I downloaded from Dr. Yari Cho discussing the diabetes reversal diet. Provided her with a handout summarizing available replacement drinks which are higher in protein and lowering carbohydrates and have less than 230 anh.  Reviewed that if she were to try this, she would need to follow her glucoses very closely and preventively decrease Levemir substantially. Reviewed with her the pathology of type 2 diabetes and how excess calorie intake needs to fat deposition in the liver and pancreas, but substantial calorie restriction can reverse this process. If she lost 30 pounds, she would likely not need insulin and may need relatively few medications. We also discussed her insurance ending at the new year. Will fill out patient assistance forms, if she qualifies. As her glucoses are well controlled at times, I do agree with her that her diet is the main limiting factor and current regimen should be able to control her glucoses. Continuing the,, metformin, Victoza, Levemir. 2. BMI 30.0-30.9,adult   -See above. -Reviewed with her how weight loss would result in improvements to energy, mood, sleep, cholesterol, blood pressure, diabetes. Discussed how she would have less health-related problems in the future if she were to have success losing weight. 3. Mixed hyperlipidemia   Continue atorvastatin weight loss efforts   4. Essential hypertension   Recommended she monitor blood pressure at home. Recommended a brachial blood pressure monitor. Reviewed how goal would be less than 146 systolic. Continue losartan.    Greater than 50% of 40 minute visit was spent counseling the patient about above. Patient Instructions   Diabetes. Continue dietary, exercise and weight loss efforts  Watch carbohydrate intake with meals and aim to keep this less than 45 grams per meal.  Weight loss would help greatly to lower insulin needs and medications needs. Lower calorie diet and weight loss would help to reduce medication needs. Would lower Levemir dose aggressively if you dramatically changes diet. Medications:  Victoza 1.8 mg daily  Combine metformin and Invokana    Levemir 65 +/- units at night  ** Follow trend from bedtime to fasting to assess  ** should need less if your diet improves and you lose weight. Goals for blood glucose:  Fasting  (less than 150)  Lunch, Dinner, Bedtime -  (less than 180). ** Decrease Levemir in 5-10 unit increments every 5-7 days for values < 90. Follow-up Disposition:  Return in about 4 months (around 4/14/2018).     Copy sent to:

## 2017-12-14 NOTE — MR AVS SNAPSHOT
Visit Information Date & Time Provider Department Dept. Phone Encounter #  
 12/14/2017  2:10 PM Edu Leary, [facility] Diabetes and Endocrinology [phone] Follow-up Instructions Return in about 4 months (around 4/14/2018). Upcoming Health Maintenance Date Due  
 EYE EXAM RETINAL OR DILATED Q1 3/2/1971 DTaP/Tdap/Td series (1 - Tdap) 3/2/1982 FOOT EXAM Q1 12/3/2016 FOBT Q 1 YEAR AGE 50-75 3/4/2017 Influenza Age 5 to Adult 8/1/2017 HEMOGLOBIN A1C Q6M 12/8/2017 PAP AKA CERVICAL CYTOLOGY 3/2/2018 MICROALBUMIN Q1 6/8/2018 LIPID PANEL Q1 6/8/2018 Allergies as of 12/14/2017  Review Complete On: 12/14/2017 By: Edu Leary MD  
 No Known Allergies Current Immunizations  Never Reviewed Name Date Influenza Vaccine 10/9/2014 Pneumococcal Vaccine (Unspecified Type) 10/9/2014 Not reviewed this visit You Were Diagnosed With   
  
 Codes Comments Type 2 diabetes mellitus without complication, with long-term current use of insulin (HCC)    -  Primary ICD-10-CM: E11.9, Z79.4 ICD-9-CM: 250.00, V58.67 BMI 30.0-30.9,adult     ICD-10-CM: Z68.30 ICD-9-CM: V85.30 Mixed hyperlipidemia     ICD-10-CM: E78.2 ICD-9-CM: 272.2 Essential hypertension     ICD-10-CM: I10 
ICD-9-CM: 401.9 Vitals BP Pulse Height(growth percentile) Weight(growth percentile) BMI OB Status 143/77 95 5' 4\" (1.626 m) 178 lb (80.7 kg) 30.55 kg/m2 Menopause Smoking Status Former Smoker Vitals History BMI and BSA Data Body Mass Index Body Surface Area 30.55 kg/m 2 1.91 m 2 Preferred Pharmacy Pharmacy Name Phone [facility] [place] [phone] [address] [facility] [address] [facility] [phone] Your Updated Medication List  
  
   
This list is accurate as of: 12/14/17  3:57 PM.  Always use your most recent med list.  
  
  
  
  
 atorvastatin 20 mg tablet Commonly known as:  LIPITOR Take 1 Tab by mouth daily. canagliflozin-metformin 150-1,000 mg Tbph  
Commonly known as:  INVOKAMET XR Take 2 Each by mouth daily. econazole nitrate 1 % topical cream  
Commonly known as:  Peola Oly Apply  to affected area two (2) times a day. insulin detemir 100 unit/mL (3 mL) Inpn Commonly known as:  LEVEMIR FLEXTOUCH  
70 Units by SubCUTAneous route nightly. Liraglutide 0.6 mg/0.1 mL (18 mg/3 mL) Pnij Commonly known as:  VICTOZA 3-KIMBERLY  
1.8 mg by SubCUTAneous route daily. losartan 100 mg tablet Commonly known as:  COZAAR  
TAKE ONE TABLET BY MOUTH DAILY  
  
 * NOVOFINE 30 30 gauge x 1/3\" Generic drug:  Insulin Needles (Disposable) USE TO INJECT VICTOZA ONCE DAILY * Insulin Needles (Disposable) 30 gauge x 1/3\"  
0.3 ml subcutaneous daily * Insulin Needles (Disposable) 31 gauge x 1/4\" Ndle Take 1-3 times daily  
  
 omeprazole 40 mg capsule Commonly known as:  PRILOSEC Take 1 Cap by mouth daily. * Notice: This list has 3 medication(s) that are the same as other medications prescribed for you. Read the directions carefully, and ask your doctor or other care provider to review them with you. Follow-up Instructions Return in about 4 months (around 4/14/2018). Patient Instructions Diabetes. Continue dietary, exercise and weight loss efforts Watch carbohydrate intake with meals and aim to keep this less than 45 grams per meal. 
Weight loss would help greatly to lower insulin needs and medications needs. Lower calorie diet and weight loss would help to reduce medication needs. Would lower Levemir dose aggressively if you dramatically changes diet. Medications: 
Victoza 1.8 mg daily Combine metformin and Invokana Levemir 65 +/- units at night 
** Follow trend from bedtime to fasting to assess ** should need less if your diet improves and you lose weight. Goals for blood glucose: Fasting  (less than 150) Lunch, Dinner, Bedtime -  (less than 180). ** Decrease Levemir in 5-10 unit increments every 5-7 days for values < 90. Introducing [facility]! Dear Lucille Berrios: Thank you for requesting a [email or web address] account. Our records indicate that you already have an active [email or web address] account. You can access your account anytime at [email or web address] [email or web address] Camp Did you know that you can access your hospital and ER discharge instructions at any time in [email or web address]? You can also review all of your test results from your hospital stay or ER visit. Additional Information If you have questions, please visit the Frequently Asked Questions section of the [email or web address] website at [email or web address] Remember, [email or web address] is NOT to be used for urgent needs. For medical emergencies, dial 911. Now available from your iPhone and Android! Please provide this summary of care documentation to your next provider. Your primary care clinician is listed as Albina Abreu. If you have any questions after today's visit, please call [phone].

## 2017-12-14 NOTE — PATIENT INSTRUCTIONS
Diabetes. Continue dietary, exercise and weight loss efforts  Watch carbohydrate intake with meals and aim to keep this less than 45 grams per meal.  Weight loss would help greatly to lower insulin needs and medications needs. Lower calorie diet and weight loss would help to reduce medication needs. Would lower Levemir dose aggressively if you dramatically changes diet. Medications:  Victoza 1.8 mg daily  Combine metformin and Invokana    Levemir 65 +/- units at night  ** Follow trend from bedtime to fasting to assess  ** should need less if your diet improves and you lose weight. Goals for blood glucose:  Fasting  (less than 150)  Lunch, Dinner, Bedtime -  (less than 180). ** Decrease Levemir in 5-10 unit increments every 5-7 days for values < 90.

## 2017-12-18 DIAGNOSIS — E11.9 TYPE 2 DIABETES MELLITUS WITHOUT COMPLICATION, UNSPECIFIED LONG TERM INSULIN USE STATUS: ICD-10-CM

## 2018-01-24 RX ORDER — SYRINGE AND NEEDLE,INSULIN,1ML 31GX15/64"
1 SYRINGE, EMPTY DISPOSABLE MISCELLANEOUS
Qty: 100 PEN NEEDLE | Refills: 3 | Status: SHIPPED | OUTPATIENT
Start: 2018-01-24 | End: 2020-04-24

## 2018-01-30 NOTE — TELEPHONE ENCOUNTER
I returned patient's call. She received a letter from [employer] stating she is approved for [employer]. Per patient, she had GI symptoms with Invokamet and was changed to Invokana 100 mg. I called Alondra Zelaya and [employer]ondra Zelaya patient assistance program and spoke to [facility]. Denny Trimble stated we can fax a new prescription for Invokana 100 mg to [phone].

## 2018-03-15 ENCOUNTER — OFFICE VISIT (OUTPATIENT)
Dept: ENDOCRINOLOGY | Age: 58
End: 2018-03-15

## 2018-03-15 VITALS
SYSTOLIC BLOOD PRESSURE: 148 MMHG | HEART RATE: 80 BPM | HEIGHT: 64 IN | DIASTOLIC BLOOD PRESSURE: 86 MMHG | WEIGHT: 177 LBS | BODY MASS INDEX: 30.22 KG/M2

## 2018-03-15 DIAGNOSIS — E78.2 MIXED HYPERLIPIDEMIA: ICD-10-CM

## 2018-03-15 DIAGNOSIS — E11.9 TYPE 2 DIABETES MELLITUS WITHOUT COMPLICATION, WITH LONG-TERM CURRENT USE OF INSULIN (HCC): Primary | ICD-10-CM

## 2018-03-15 DIAGNOSIS — Z79.4 TYPE 2 DIABETES MELLITUS WITHOUT COMPLICATION, WITH LONG-TERM CURRENT USE OF INSULIN (HCC): Primary | ICD-10-CM

## 2018-03-15 DIAGNOSIS — I10 ESSENTIAL HYPERTENSION: ICD-10-CM

## 2018-03-15 NOTE — PATIENT INSTRUCTIONS
Diabetes. Continue dietary, exercise and weight loss efforts  Keep calories low to achieve weight loss which would help low carbohydrate intake     Lower calorie diet and weight loss would help to reduce medication needs. Would lower Levemir dose aggressively if you dramatically changes diet. Medications:  Victoza 1.8 mg daily  Combine metformin and Invokana    Levemir 70 units     - lower dose if you have lows. Goals for blood glucose:  Fasting  (less than 150)  Lunch, Dinner, Bedtime -  (less than 180). Blood pressure:  Continue losartan  Monitor at home. Optimal is < 120/80. Let me know if you have values > 130/85 often at home.

## 2018-03-15 NOTE — MR AVS SNAPSHOT
[address] [facility] 
[phone] Patient: Edith Klinefelter MRN: [id number] IBL:[birth year] Visit Information Date & Time Provider Department Dept. Phone Encounter #  
 3/15/2018  3:30 PM Caesar Epley, [facility] Diabetes and Endocrinology [phone] Follow-up Instructions Return in about 4 months (around 7/15/2018). Upcoming Health Maintenance Date Due DTaP/Tdap/Td series (1 - Tdap) 3/2/1982 BREAST CANCER SCRN MAMMOGRAM 3/2/2011 FOOT EXAM Q1 12/3/2016 FOBT Q 1 YEAR AGE 50-75 3/4/2017 Influenza Age 5 to Adult 8/1/2017 HEMOGLOBIN A1C Q6M 12/8/2017 PAP AKA CERVICAL CYTOLOGY 3/2/2018 MICROALBUMIN Q1 6/8/2018 LIPID PANEL Q1 6/8/2018 EYE EXAM RETINAL OR DILATED Q1 12/19/2018 Allergies as of 3/15/2018  Review Complete On: 3/15/2018 By: Caesar Epley, MD  
 No Known Allergies Current Immunizations  Never Reviewed Name Date Influenza Vaccine 10/9/2014 Pneumococcal Vaccine (Unspecified Type) 10/9/2014 Not reviewed this visit You Were Diagnosed With   
  
 Codes Comments Type 2 diabetes mellitus without complication, with long-term current use of insulin (HCC)    -  Primary ICD-10-CM: E11.9, Z79.4 ICD-9-CM: 250.00, V58.67 Essential hypertension     ICD-10-CM: I10 
ICD-9-CM: 401.9 Mixed hyperlipidemia     ICD-10-CM: E78.2 ICD-9-CM: 272.2 BMI 30.0-30.9,adult     ICD-10-CM: Z68.30 ICD-9-CM: V85.30 Vitals BP Pulse Height(growth percentile) Weight(growth percentile) BMI OB Status 148/86 80 5' 4\" (1.626 m) 177 lb (80.3 kg) 30.38 kg/m2 Menopause Smoking Status Former Smoker Vitals History BMI and BSA Data Body Mass Index Body Surface Area  
 30.38 kg/m 2 1.9 m 2 [facility]erred [facility] [address] [facility] [address] [facility] [phone] Your Updated Medication List  
  
   
This list is accurate as of 3/15/18  4:25 PM.  Always use your most recent med list.  
  
  
  
  
 atorvastatin 20 mg tablet Commonly known as:  LIPITOR Take 1 Tab by mouth daily. canagliflozin 100 mg tablet Commonly known as:  Irine Jansky Take 1 Tab by mouth Daily (before breakfast). econazole nitrate 1 % topical cream  
Commonly known as:  Magi Gram Apply  to affected area two (2) times a day. insulin detemir U-100 100 unit/mL (3 mL) Inpn Commonly known as:  LEVEMIR FLEXTOUCH  
70 Units by SubCUTAneous route nightly. insulin syringe-needle U-100 1 mL 31 gauge x 15/64\" Syrg 1 Each by SubCUTAneous route nightly. Liraglutide 0.6 mg/0.1 mL (18 mg/3 mL) Pnij Commonly known as:  VICTOZA 3-KIMBERLY  
1.8 mg by SubCUTAneous route daily. losartan 100 mg tablet Commonly known as:  COZAAR  
TAKE ONE TABLET BY MOUTH DAILY  
  
 * NOVOFINE 30 30 gauge x 1/3\" Generic drug:  Insulin Needles (Disposable) USE TO INJECT VICTOZA ONCE DAILY * Insulin Needles (Disposable) 30 gauge x 1/3\"  
0.3 ml subcutaneous daily * Insulin Needles (Disposable) 31 gauge x 1/4\" Ndle Take 1-3 times daily  
  
 omeprazole 40 mg capsule Commonly known as:  PRILOSEC Take 1 Cap by mouth daily. * Notice: This list has 3 medication(s) that are the same as other medications prescribed for you. Read the directions carefully, and ask your doctor or other care provider to review them with you. We Performed the Following HEMOGLOBIN A1C WITH EAG [74477 CPT(R)]  DIABETES FOOT EXAM [HM7 Custom] LIPID PANEL [02756 CPT(R)] METABOLIC PANEL, COMPREHENSIVE [77541 CPT(R)] MICROALBUMIN, UR, RAND W/ MICROALB/CREAT RATIO [id number] CPT(R)] Follow-up Instructions Return in about 4 months (around 7/15/2018). Patient Instructions Diabetes. Continue dietary, exercise and weight loss efforts Keep calories low to achieve weight loss which would help low carbohydrate intake Lower calorie diet and weight loss would help to reduce medication needs. Would lower Levemir dose aggressively if you dramatically changes diet. Medications: 
Victoza 1.8 mg daily Combine metformin and Invokana Levemir 70 units - lower dose if you have lows. Goals for blood glucose: 
Fasting  (less than 150) Lunch, Dinner, Bedtime -  (less than 180). Blood pressure: 
Continue losartan 
Monitor at home. Optimal is < 120/80. Let me know if you have values > 130/85 often at home. Introducing [address]! Dear Angel Short: Thank you for requesting a SecureLink account. Our records indicate that you already have an active SecureLink account. You can access your account anytime at [email or web address] Did you know that you can access your hospital and ER discharge instructions at any time in SecureLink? You can also review all of your test results from your hospital stay or ER visit. Additional Information If you have questions, please visit the Frequently Asked Questions section of the SecureLink website at [email or web address] Remember, SecureLink is NOT to be used for urgent needs. For medical emergencies, dial 911. Now available from your iPhone and Android! Please provide this summary of care documentation to your next provider. Your primary care clinician is listed as Bety House. If you have any questions after today's visit, please call [phone].

## 2018-03-15 NOTE — PROGRESS NOTES
History of Present Illness: Marlena Preciado is a 63 y.o. female presents for follow-up of diabetes. she has had diabetes since 2005. Also had gestational diabetes around age 41. She also has dyslipidemia, HTN and BMI of 30. Diabetes related complications:  No microalbumin  No neuropathy sx. No known retinopathy. Current diabetes regimen:  Invokana 100 mg   Metformin 1000 mg twice daily  victoza 1.8 mg daily  Levemir -taking 65 units  in evenings. Weight is down about 10 lbs from 4/2017 visit and has been stable since. Glucoses:  -all glucoses 100-150 today  - has some values in 300s when she eats poorly. She recognizes that her glucose control is very diet dependent. No lows. Diet:  [employer] spaghetti for breakfast  Nabs and peanuts for snack    1120 anh yesterday. Diet is variable- will eat candies on occasion. Also will have occasional high calorie meals at [facility]. Lipids atorvastatin 20 mg    BP: losartan 100 mg. Believes BP better with Dr Eddy Mercedes. Not monitoring at home. Social;  She is no longer working. She does not have insurance and hopes to obtain the also core \"care card\"        Past Medical History:   Diagnosis Date    Diabetes ([facility] [address]     High cholesterol     Hypercholesteremia     Hypertension     Ill-defined condition     hyperlipidemia     Current Outpatient Prescriptions   Medication Sig    canagliflozin (INVOKANA) 100 mg tablet Take 1 Tab by mouth Daily (before breakfast).  insulin syringe-needle U-100 1 mL 31 gauge x 15/64\" syrg 1 Each by SubCUTAneous route nightly.  Liraglutide (VICTOZA 3-KIMBERLY) 0.6 mg/0.1 mL (18 mg/3 mL) pnij 1.8 mg by SubCUTAneous route daily.  insulin detemir (LEVEMIR FLEXTOUCH) 100 unit/mL (3 mL) inpn 70 Units by SubCUTAneous route nightly.     losartan (COZAAR) 100 mg tablet TAKE ONE TABLET BY MOUTH DAILY    Insulin Needles, Disposable, 31 gauge x 1/4\" ndle Take 1-3 times daily    NOVOFINE 30 30 gauge x 1/3\" USE TO INJECT VICTOZA ONCE DAILY    Insulin Needles, Disposable, 30 gauge x 1/3\" 0.3 ml subcutaneous daily    atorvastatin (LIPITOR) 20 mg tablet Take 1 Tab by mouth daily.  omeprazole (PRILOSEC) 40 mg capsule Take 1 Cap by mouth daily.  econazole nitrate (SPECTAZOLE) 1 % topical cream Apply  to affected area two (2) times a day. No current facility-administered medications for this visit. No Known Allergies    Review of Systems:  - Eyes: no blurry vision or double vision  - Cardiovascular: no chest pain  - Respiratory: no shortness of breath  - Musculoskeletal: no myalgias  - Neurological: no numbness/tingling in extremities    Physical Examination:  Visit Vitals    /86    Pulse 80    Ht 5' 4\" (1.626 m)    Wt 177 lb (80.3 kg)    BMI 30.38 kg/m2   -   - General: pleasant, no distress, normal gait   HEENT: hearing intact, EOMI, clear sclera without icterus  - Cardiovascular: regular, normal rate   - Respiratory: normal effort  - Integumentary: no edema   Diabetic foot exam:     Left: Filament test normal sensation with micro filament   Pulse DP: 2+ (normal)   Deformities: None      - Psychiatric: normal mood and affect    Data Reviewed:   Hemoglobin A1c 9.4  Cholesterol 134, triglycerides 111, HDL 43, LDL 69    Assessment/Plan:   1. Type 2 diabetes mellitus without complication, with long-term current use of insulin (HCC)   Glucoses can be very well controlled when she eats reasonably well. Encouraged her to consistently focus on dietary efforts. Encouraged exercise efforts as well  Continue Levemir 65 units, but decrease if glucoses are less than 90  Continue Victoza, Invokana and metformin. 2. Essential hypertension   Blood pressure elevated in our office. Will follow for now. Continue losartan and weight loss efforts   3. Mixed hyperlipidemia   Continue atorvastatin and dietary weight loss efforts   4. BMI 30.0-30.9,adult   Encouraged her to focus on her dietary improvements.   If glucoses go to levels below 90, she will decrease her Levemir, which will further help with weight loss efforts. Patient Instructions   Diabetes. Continue dietary, exercise and weight loss efforts  Keep calories low to achieve weight loss which would help low carbohydrate intake     Lower calorie diet and weight loss would help to reduce medication needs. Would lower Levemir dose aggressively if you dramatically changes diet. Medications:  Victoza 1.8 mg daily  Combine metformin and Invokana    Levemir 70 units     - lower dose if you have lows. Goals for blood glucose:  Fasting  (less than 150)  Lunch, Dinner, Bedtime -  (less than 180). Blood pressure:  Continue losartan  Monitor at home. Optimal is < 120/80. Let me know if you have values > 130/85 often at home. Follow-up Disposition:  Return in about 4 months (around 7/15/2018).     Copy sent to:

## 2018-03-16 LAB
ALBUMIN SERPL-MCNC: 4.5 G/DL (ref 3.5–5.5)
ALBUMIN/CREAT UR: 23.5 MG/G CREAT (ref 0–30)
ALBUMIN/GLOB SERPL: 1.5 {RATIO} (ref 1.2–2.2)
ALP SERPL-CCNC: 83 IU/L (ref 39–117)
ALT SERPL-CCNC: 22 IU/L (ref 0–32)
AST SERPL-CCNC: 22 IU/L (ref 0–40)
BILIRUB SERPL-MCNC: 0.2 MG/DL (ref 0–1.2)
BUN SERPL-MCNC: 15 MG/DL (ref 6–24)
BUN/CREAT SERPL: 21 (ref 9–23)
CALCIUM SERPL-MCNC: 9.5 MG/DL (ref 8.7–10.2)
CHLORIDE SERPL-SCNC: 100 MMOL/L (ref 96–106)
CHOLEST SERPL-MCNC: 152 MG/DL (ref 100–199)
CO2 SERPL-SCNC: 24 MMOL/L (ref 18–29)
CREAT SERPL-MCNC: 0.72 MG/DL (ref 0.57–1)
CREAT UR-MCNC: 31 MG/DL
EST. AVERAGE GLUCOSE BLD GHB EST-MCNC: 197 MG/DL
GFR SERPLBLD CREATININE-BSD FMLA CKD-EPI: 108 ML/MIN/1.73
GFR SERPLBLD CREATININE-BSD FMLA CKD-EPI: 93 ML/MIN/1.73
GLOBULIN SER CALC-MCNC: 3.1 G/DL (ref 1.5–4.5)
GLUCOSE SERPL-MCNC: 110 MG/DL (ref 65–99)
HBA1C MFR BLD: 8.5 % (ref 4.8–5.6)
HDLC SERPL-MCNC: 41 MG/DL
LDLC SERPL CALC-MCNC: 80 MG/DL (ref 0–99)
MICROALBUMIN UR-MCNC: 7.3 UG/ML
POTASSIUM SERPL-SCNC: 4.5 MMOL/L (ref 3.5–5.2)
PROT SERPL-MCNC: 7.6 G/DL (ref 6–8.5)
SODIUM SERPL-SCNC: 141 MMOL/L (ref 134–144)
TRIGL SERPL-MCNC: 156 MG/DL (ref 0–149)
VLDLC SERPL CALC-MCNC: 31 MG/DL (ref 5–40)

## 2018-04-26 ENCOUNTER — TELEPHONE (OUTPATIENT)
Dept: ENDOCRINOLOGY | Age: 58
End: 2018-04-26

## 2018-04-26 NOTE — TELEPHONE ENCOUNTER
4/26/2018  1:53 PM        Ms. Mcdonald need a refill for Liraglutide (VICTOZA 3-KIMBERLY) 0.6 mg/0.1 mL (18 mg/3 mL) pnij using patient assistant.             Thanks

## 2018-06-14 ENCOUNTER — OFFICE VISIT (OUTPATIENT)
Dept: ENDOCRINOLOGY | Age: 58
End: 2018-06-14

## 2018-06-14 VITALS
WEIGHT: 180 LBS | BODY MASS INDEX: 30.73 KG/M2 | DIASTOLIC BLOOD PRESSURE: 80 MMHG | SYSTOLIC BLOOD PRESSURE: 150 MMHG | HEIGHT: 64 IN | HEART RATE: 84 BPM

## 2018-06-14 DIAGNOSIS — E78.2 MIXED HYPERLIPIDEMIA: ICD-10-CM

## 2018-06-14 DIAGNOSIS — E11.9 TYPE 2 DIABETES MELLITUS WITHOUT COMPLICATION, WITHOUT LONG-TERM CURRENT USE OF INSULIN (HCC): Primary | ICD-10-CM

## 2018-06-14 DIAGNOSIS — M79.672 ARCH PAIN OF LEFT FOOT: ICD-10-CM

## 2018-06-14 DIAGNOSIS — I10 ESSENTIAL HYPERTENSION: ICD-10-CM

## 2018-06-14 NOTE — PROGRESS NOTES
History of Present Illness: Dolores Drew is a 63 y.o. female presents for follow-up of diabetes. she has had diabetes since . Also had gestational diabetes around age 41. She also has dyslipidemia, HTN and BMI of 30. Diabetes related complications:  No microalbumin  No neuropathy sx. No known retinopathy. Current diabetes regimen:  Invokana 100 mg   Metformin 1000 mg twice daily  victoza 1.8 mg daily  Levemir -taking 70 units  in evenings. Weight is stable. Glucoses:  No lows. Reviewed meter. Breakfast avg 173, lunch 148, dinner, 222  Most are checked fasting - values range from 100 - 336, but most values are 130+ . Glucoses are higher when she 'eats the wrong thing'. Diet:  2 hotdogs at lunch. Has trouble staying on track with her diet. Sleep is poor as regards to quality. She spends 7-8 hours in bed. Her watch shows that she spends about half the time in 'deep sleep' and the other half in light sleep. Lipids atorvastatin 20 mg    BP: losartan 100 mg. BP consistently high in our office. She has BP monitor at home she can use. BMI 30.9 . Wt stable since last visit. Social;  She does not have insurance and is using Care Card. Dog  2 weeks ago. Past Medical History:   Diagnosis Date    Diabetes ([address]     High cholesterol     Hypercholesteremia     Hypertension     Ill-defined condition     hyperlipidemia     Current Outpatient Prescriptions   Medication Sig    INVOKANA 100 mg tablet TAKE ONE TABLET BY MOUTH DAILY    insulin syringe-needle U-100 1 mL 31 gauge x 15/64\" syrg 1 Each by SubCUTAneous route nightly.  Liraglutide (VICTOZA 3-KIMBERLY) 0.6 mg/0.1 mL (18 mg/3 mL) pnij 1.8 mg by SubCUTAneous route daily.  insulin detemir (LEVEMIR FLEXTOUCH) 100 unit/mL (3 mL) inpn 70 Units by SubCUTAneous route nightly.     losartan (COZAAR) 100 mg tablet TAKE ONE TABLET BY MOUTH DAILY    Insulin Needles, Disposable, 31 gauge x 1/\" ndle Take 1-3 times daily  NOVOFINE 30 30 gauge x 1/3\" USE TO INJECT VICTOZA ONCE DAILY    Insulin Needles, Disposable, 30 gauge x 1/3\" 0.3 ml subcutaneous daily    atorvastatin (LIPITOR) 20 mg tablet Take 1 Tab by mouth daily.  omeprazole (PRILOSEC) 40 mg capsule Take 1 Cap by mouth daily.  econazole nitrate (SPECTAZOLE) 1 % topical cream Apply  to affected area two (2) times a day. No current facility-administered medications for this visit. No Known Allergies    Review of Systems:  - Eyes: no blurry vision or double vision  - Cardiovascular: no chest pain  - Respiratory: no shortness of breath  - Musculoskeletal: no myalgias  - Neurological: no numbness/tingling in extremities. She does have left arch pain. Physical Examination:  Visit Vitals    /80    Pulse 84    Ht 5' 4\" (1.626 m)    Wt 180 lb (81.6 kg)    BMI 30.9 kg/m2   -   - General: pleasant, no distress, normal gait   HEENT: hearing intact, EOMI, clear sclera without icterus  - Cardiovascular: regular, normal rate   - Respiratory: normal effort  - Integumentary: no edema   Feet evaluated with standing. She does have pronation. No tenderness anterior to calcaneous - does not appear to have plantar fasciitis.    - Psychiatric: normal mood and affect    Data Reviewed:   Component      Latest Ref Rng & Units 3/15/2018 3/15/2018 3/15/2018 3/15/2018           4:53 PM  4:53 PM  4:53 PM  4:53 PM   Glucose      65 - 99 mg/dL    110 (H)   BUN      6 - 24 mg/dL    15   Creatinine      0.57 - 1.00 mg/dL    0.72   GFR est non-AA      >59 mL/min/1.73    93   GFR est AA      >59 mL/min/1.73    108   BUN/Creatinine ratio      9 - 23    21   Sodium      134 - 144 mmol/L    141   Potassium      3.5 - 5.2 mmol/L    4.5   Chloride      96 - 106 mmol/L    100   CO2      18 - 29 mmol/L    24   Calcium      8.7 - 10.2 mg/dL    9.5   Protein, total      6.0 - 8.5 g/dL    7.6   Albumin      3.5 - 5.5 g/dL    4.5   GLOBULIN, TOTAL      1.5 - 4.5 g/dL    3.1   A-G Ratio 1.2 - 2.2    1.5   Bilirubin, total      0.0 - 1.2 mg/dL    0.2   Alk. phosphatase      39 - 117 IU/L    83   AST      0 - 40 IU/L    22   ALT (SGPT)      0 - 32 IU/L    22   Cholesterol, total      100 - 199 mg/dL  152     Triglyceride      0 - 149 mg/dL  156 (H)     HDL Cholesterol      >39 mg/dL  41     VLDL, calculated      5 - 40 mg/dL  31     LDL, calculated      0 - 99 mg/dL  80     Creatinine, urine      Not Estab. mg/dL 31.0      Microalbumin, urine      Not Estab. ug/mL 7.3      Microalbumin/Creat. Ratio      0.0 - 30.0 mg/g creat 23.5      Hemoglobin A1c, (calculated)      4.8 - 5.6 %   8.5 (H)    Estimated average glucose      mg/dL   197        Assessment/Plan:   1. Type 2 diabetes mellitus without complication, without long-term current use of insulin (HCC)   - not controlled. She needs to better control her diet. - Increase Levemir to 80 units daily. Lower dose back down for values < 90  -continue Invokana, Victoza, metformin     2. Essential hypertension   - not controlled  -monitor at home. Consider adding amlodipine or indapamide to losartan, if needed. 3. Mixed hyperlipidemia   - continue atorvastatin. Wt loss encouraged   4. BMI 30.0-30.9,adult   - she needs to work on dietary improvements and less snacking and indulging.  - discussed possibly using phentermine to help her follow-through better with dietary plan. Reviewed potential side effects. Would start with 1/4 tablet daily and increase to 1/2 tablet if tolerated. 5. Arch pain of left foot   - recommend she obtain orthotic with arch support. Vs seeing podiatrist.      Patient Instructions   Diabetes. Work on dietary, exercise and weight loss efforts    Medications:  Victoza 1.8 mg daily  Combine metformin and Invokana    Increase dose to Levemir 80 units     - lower dose if you have lows (<90)     Monitor more before dinner and at bedtime. Continue monitoring fasting.    Goals for blood glucose:  Fasting  (less than 150)  Lunch, Dinner, Bedtime -  (less than 180). Blood pressure:  Continue losartan  Monitor at home. Optimal is < 120/80. Let me know if you have values > 130/85 often at home. Would add amlodipine (calcium channel blocker, like amlodipine) or indapamide (long-acting diuretic). Weight  - diet, exercise efforts  Medications:   Consider:   Phentermine - 37.5 mg tablet. Start with 1/4 tablet daily  IF tolerating, can increase to 1/2 tablet in 2-4 weeks  Take early in the morning. Let me know if you have any problems with increase heart rate, blood pressure, anxiety or trouble sleeping as these can be side effects. If symptoms are mild, you body should acclimate to this and any related symptoms should improve.       Follow-up Disposition:  Return in about 4 months (around 10/14/2018).     Copy sent to:

## 2018-06-14 NOTE — MR AVS SNAPSHOT
[address] [place] [address] 
[phone] Patient: Tanya Grover MRN: [id number] NGR:0/1/[id number] Visit Information Date & Time Provider Department Dept. Phone Encounter #  
 6/14/2018  4:10 PM Hossein Corral, 3[address] [facility] Diabetes and Endocrinology [id number] Follow-up Instructions Return in about 4 months (around 10/14/2018). Upcoming Health Maintenance Date Due DTaP/Tdap/Td series (1 - Tdap) 3/2/1982 BREAST CANCER SCRN MAMMOGRAM 3/2/2011 FOBT Q 1 YEAR AGE 50-75 3/4/2017 PAP AKA CERVICAL CYTOLOGY 3/2/2018 Influenza Age 5 to Adult 8/1/2018 HEMOGLOBIN A1C Q6M 9/15/2018 EYE EXAM RETINAL OR DILATED Q1 12/19/2018 FOOT EXAM Q1 3/15/2019 MICROALBUMIN Q1 3/15/2019 LIPID PANEL Q1 3/15/2019 Allergies as of 6/14/2018  Review Complete On: 6/14/2018 By: Hossein Corral MD  
 No Known Allergies Current Immunizations  Never Reviewed Name Date Influenza Vaccine 10/9/2014 Pneumococcal Vaccine (Unspecified Type) 10/9/2014 Not reviewed this visit You Were Diagnosed With   
  
 Codes Comments Type 2 diabetes mellitus without complication, without long-term current use of insulin (HCC)    -  Primary ICD-10-CM: E11.9 ICD-9-CM: 250.00 Essential hypertension     ICD-10-CM: I10 
ICD-9-CM: 401.9 Mixed hyperlipidemia     ICD-10-CM: E78.2 ICD-9-CM: 272.2 BMI 30.0-30.9,adult     ICD-10-CM: Z68.30 ICD-9-CM: V85.30 Vitals BP Pulse Height(growth percentile) Weight(growth percentile) BMI OB Status 150/80 84 5' 4\" (1.626 m) 180 lb (81.6 kg) 30.9 kg/m2 Menopause Smoking Status Former Smoker Vitals History BMI and BSA Data Body Mass Index Body Surface Area 30.9 kg/m 2 1.92 m 2 Preferred Pharmacy Pharmacy Name Phone Kerri Ferrara [facility] [address] [facility] [phone] Your Updated Medication List  
  
   
This list is accurate as of 6/14/18  5:18 PM.  Always use your most recent med list.  
  
  
  
  
 atorvastatin 20 mg tablet Commonly known as:  LIPITOR Take 1 Tab by mouth daily. econazole nitrate 1 % topical cream  
Commonly known as:  Elia Motto Apply  to affected area two (2) times a day. insulin detemir U-100 100 unit/mL (3 mL) Inpn Commonly known as:  LEVEMIR FLEXTOUCH  
70 Units by SubCUTAneous route nightly. insulin syringe-needle U-100 1 mL 31 gauge x 15/64\" Syrg 1 Each by SubCUTAneous route nightly. INVOKANA 100 mg tablet Generic drug:  canagliflozin TAKE ONE TABLET BY MOUTH DAILY Liraglutide 0.6 mg/0.1 mL (18 mg/3 mL) Pnij Commonly known as:  VICTOZA 3-KIMBERLY  
1.8 mg by SubCUTAneous route daily. losartan 100 mg tablet Commonly known as:  COZAAR  
TAKE ONE TABLET BY MOUTH DAILY  
  
 * NOVOFINE 30 30 gauge x 1/3\" Generic drug:  Insulin Needles (Disposable) USE TO INJECT VICTOZA ONCE DAILY * Insulin Needles (Disposable) 30 gauge x 1/3\"  
0.3 ml subcutaneous daily * Insulin Needles (Disposable) 31 gauge x 1/4\" Ndle Take 1-3 times daily  
  
 omeprazole 40 mg capsule Commonly known as:  PRILOSEC Take 1 Cap by mouth daily. * Notice: This list has 3 medication(s) that are the same as other medications prescribed for you. Read the directions carefully, and ask your doctor or other care provider to review them with you. We Performed the Following HEMOGLOBIN A1C WITH EAG [36619 CPT(R)] METABOLIC PANEL, BASIC [68481 CPT(R)] URINALYSIS W/ RFLX MICROSCOPIC [10588 CPT(R)] Follow-up Instructions Return in about 4 months (around 10/14/2018). Patient Instructions Diabetes. Work on dietary, exercise and weight loss efforts Medications: 
Victoza 1.8 mg daily Combine metformin and Invokana Increase dose to Levemir 80 units - lower dose if you have lows (<90) Monitor more before dinner and at bedtime. Continue monitoring fasting. Goals for blood glucose: 
Fasting  (less than 150) Lunch, Dinner, Bedtime -  (less than 180). Blood pressure: 
Continue losartan 
Monitor at home. Optimal is < 120/80. Let me know if you have values > 130/85 often at home. Would add amlodipine (calcium channel blocker, like amlodipine) or indapamide (long-acting diuretic). Weight 
- diet, exercise efforts Medications:  
Consider:  
Phentermine - 37.5 mg tablet. Start with 1/4 tablet daily IF tolerating, can increase to 1/2 tablet in 2-4 weeks Take early in the morning. Let me know if you have any problems with increase heart rate, blood pressure, anxiety or trouble sleeping as these can be side effects. If symptoms are mild, you body should acclimate to this and any related symptoms should improve. 
  
 
 
  
Introducing [facility] & HEALTH SERVICES! Dear Gardner Leyden: Thank you for requesting a [employer] account. Our records indicate that you already have an active [employer] account. You can access your account anytime at [email or web address]. [email or web address] Did you know that you can access your hospital and ER discharge instructions at any time in [employer]? You can also review all of your test results from your hospital stay or ER visit. Additional Information If you have questions, please visit the Frequently Asked Questions section of the [employer] website at [email or web address] Remember, [employer] is NOT to be used for urgent needs. For medical emergencies, dial 911. Now available from your iPhone and Android! Please provide this summary of care documentation to your next provider. Your primary care clinician is listed as Delilah Lau. If you have any questions after today's visit, please call [phone].

## 2018-06-14 NOTE — PATIENT INSTRUCTIONS
Diabetes. Work on dietary, exercise and weight loss efforts    Medications:  Victoza 1.8 mg daily  Combine metformin and Invokana    Increase dose to Levemir 80 units     - lower dose if you have lows (<90)     Monitor more before dinner and at bedtime. Continue monitoring fasting. Goals for blood glucose:  Fasting  (less than 150)  Lunch, Dinner, Bedtime -  (less than 180). Blood pressure:  Continue losartan  Monitor at home. Optimal is < 120/80. Let me know if you have values > 130/85 often at home. Would add amlodipine (calcium channel blocker, like amlodipine) or indapamide (long-acting diuretic). Weight  - diet, exercise efforts  Medications:   Consider:   Phentermine - 37.5 mg tablet. Start with 1/4 tablet daily  IF tolerating, can increase to 1/2 tablet in 2-4 weeks  Take early in the morning. Let me know if you have any problems with increase heart rate, blood pressure, anxiety or trouble sleeping as these can be side effects.  If symptoms are mild, you body should acclimate to this and any related symptoms should improve.

## 2018-06-16 LAB
APPEARANCE UR: CLEAR
BILIRUB UR QL STRIP: NEGATIVE
BUN SERPL-MCNC: 13 MG/DL (ref 6–24)
BUN/CREAT SERPL: 18 (ref 9–23)
CALCIUM SERPL-MCNC: 9.3 MG/DL (ref 8.7–10.2)
CHLORIDE SERPL-SCNC: 101 MMOL/L (ref 96–106)
CO2 SERPL-SCNC: 23 MMOL/L (ref 20–29)
COLOR UR: YELLOW
CREAT SERPL-MCNC: 0.72 MG/DL (ref 0.57–1)
EST. AVERAGE GLUCOSE BLD GHB EST-MCNC: 214 MG/DL
GFR SERPLBLD CREATININE-BSD FMLA CKD-EPI: 108 ML/MIN/1.73
GFR SERPLBLD CREATININE-BSD FMLA CKD-EPI: 93 ML/MIN/1.73
GLUCOSE SERPL-MCNC: 129 MG/DL (ref 65–99)
GLUCOSE UR QL: ABNORMAL
HBA1C MFR BLD: 9.1 % (ref 4.8–5.6)
HGB UR QL STRIP: NEGATIVE
KETONES UR QL STRIP: NEGATIVE
LEUKOCYTE ESTERASE UR QL STRIP: NEGATIVE
MICRO URNS: ABNORMAL
NITRITE UR QL STRIP: NEGATIVE
PH UR STRIP: 6.5 [PH] (ref 5–7.5)
POTASSIUM SERPL-SCNC: 4.1 MMOL/L (ref 3.5–5.2)
PROT UR QL STRIP: NEGATIVE
SODIUM SERPL-SCNC: 140 MMOL/L (ref 134–144)
SP GR UR: 1.01 (ref 1–1.03)
UROBILINOGEN UR STRIP-MCNC: 0.2 MG/DL (ref 0.2–1)

## 2018-06-21 ENCOUNTER — HOSPITAL ENCOUNTER (OUTPATIENT)
Dept: MAMMOGRAPHY | Age: 58
Discharge: HOME | End: 2018-06-21
Attending: OBSTETRICS & GYNECOLOGY
Payer: SUBSIDIZED

## 2018-06-21 DIAGNOSIS — Z12.39 BREAST SCREENING: ICD-10-CM

## 2018-06-21 PROCEDURE — 77067 SCR MAMMO BI INCL CAD: CPT

## 2018-08-15 ENCOUNTER — PATIENT OUTREACH (OUTPATIENT)
Dept: ENDOCRINOLOGY | Age: 58
End: 2018-08-15

## 2018-08-15 NOTE — PROGRESS NOTES
8/15/18  NN completed patient assistance program (PAP) with [facility] [employer] for Victoza per MD request.      Reorder request completed and faxed. Will be scanned into chart.

## 2018-08-30 RX ORDER — PHENTERMINE HYDROCHLORIDE 37.5 MG/1
TABLET ORAL
Qty: 30 TAB | Refills: 2 | Status: SHIPPED | OUTPATIENT
Start: 2018-08-30 | End: 2018-09-27

## 2018-09-27 ENCOUNTER — OFFICE VISIT (OUTPATIENT)
Dept: ENDOCRINOLOGY | Age: 58
End: 2018-09-27

## 2018-09-27 VITALS
DIASTOLIC BLOOD PRESSURE: 79 MMHG | HEART RATE: 75 BPM | BODY MASS INDEX: 31.28 KG/M2 | SYSTOLIC BLOOD PRESSURE: 137 MMHG | WEIGHT: 183.2 LBS | HEIGHT: 64 IN | OXYGEN SATURATION: 99 %

## 2018-09-27 DIAGNOSIS — E11.9 TYPE 2 DIABETES MELLITUS WITHOUT COMPLICATION, WITHOUT LONG-TERM CURRENT USE OF INSULIN (HCC): Primary | ICD-10-CM

## 2018-09-27 DIAGNOSIS — E78.2 MIXED HYPERLIPIDEMIA: ICD-10-CM

## 2018-09-27 DIAGNOSIS — I10 ESSENTIAL HYPERTENSION: ICD-10-CM

## 2018-09-27 NOTE — PROGRESS NOTES
Chief Complaint Patient presents with  Diabetes 1. Have you been to the ER, urgent care clinic since your last visit? Hospitalized since your last visit? no 
 
2. Have you seen or consulted any other health care providers outside of the [address] [place] [address] since your last visit? Include any pap smears or colon screening. Pap smear normal results

## 2018-09-27 NOTE — PATIENT INSTRUCTIONS
Diabetes. Work on dietary, exercise and weight loss efforts Medications: 
Victoza 1.8 mg daily Combine metformin and Invokana Continue Levemir 80 units -  lower dose if you have lows (<90) Monitor more before dinner and at bedtime. Continue monitoring fasting. Goals for blood glucose: 
Fasting  (less than 150) Lunch, Dinner, Bedtime -  (less than 180).  
 
Blood pressure: 
Continue losartan

## 2018-09-27 NOTE — PROGRESS NOTES
History of Present Illness: Moni Vaughan is a 63 y.o. female presents for follow-up of diabetes. she has had diabetes since 2005. Also had gestational diabetes around age 41. She also has dyslipidemia, HTN and BMI of 30. Diabetes related complications: 
No microalbumin No neuropathy sx. No known retinopathy. Current diabetes regimen: 
Invokana 100 mg  
Metformin 1000 mg twice daily 
victoza 1.8 mg daily Levemir -taking 80 units  in evenings. Weight is stable. Glucoses: 
No lows. Reviewed meter. Breakfast avg 188, lunch 163, dinner, 182 
123 this AM and 118 midday today. Diet: 
Has trouble eating poorly when stressed. Sleeping has improved. Lipids atorvastatin 20 mg 
 
BP: losartan 100 mg. Blood pressure is improved today BMI 31 . Wt stable since last visit. Social; She does not have insurance and is using Care Card. She is getting her branded medications through patient assistance She is currently working. Past Medical History:  
Diagnosis Date  [facility] ([facility]  High cholesterol  Hypercholesteremia  Hypertension  Ill-defined condition   
 hyperlipidemia Current Outpatient Prescriptions Medication Sig  
 metFORMIN (GLUCOPHAGE) 500 mg tablet TAKE TWO TABLETS BY MOUTH TWICE A DAY WITH MEALS  
 INVOKANA 100 mg tablet TAKE ONE TABLET BY MOUTH DAILY  insulin syringe-needle U-100 1 mL 31 gauge x 15/64\" syrg 1 Each by SubCUTAneous route nightly.  Liraglutide (VICTOZA 3-KIMBERLY) 0.6 mg/0.1 mL (18 mg/3 mL) pnij 1.8 mg by SubCUTAneous route daily.  insulin detemir (LEVEMIR FLEXTOUCH) 100 unit/mL (3 mL) inpn 70 Units by SubCUTAneous route nightly.  losartan (COZAAR) 100 mg tablet TAKE ONE TABLET BY MOUTH DAILY  Insulin Needles, Disposable, 31 gauge x 1/4\" ndle Take 1-3 times daily  Insulin Needles, Disposable, 30 gauge x 1/3\" 0.3 ml subcutaneous daily  atorvastatin (LIPITOR) 20 mg tablet Take 1 Tab by mouth daily.  omeprazole (PRILOSEC) 40 mg capsule Take 1 Cap by mouth daily.  phentermine (ADIPEX-P) 37.5 mg tablet 1/2 tablet daily  NOVOFINE 30 30 gauge x 1/3\" USE TO INJECT VICTOZA ONCE DAILY  econazole nitrate (SPECTAZOLE) 1 % topical cream Apply  to affected area two (2) times a day. No current facility-administered medications for this visit. No Known Allergies Review of Systems: - Eyes: no blurry vision or double vision - Cardiovascular: She does have some substernal chest pain, but she reports this to be mild and thinks it is indigestion. No GARCIA. 
- Respiratory: no shortness of breath - Musculoskeletal: no myalgias - Neurological: no numbness/tingling in extremities Physical Examination: 
Visit Vitals  /79  Pulse 75  Ht 5' 4\" (1.626 m)  Wt 183 lb 3.2 oz (83.1 kg)  SpO2 99%  BMI 31.45 kg/m2  
-  
- General: pleasant, no distress, normal gait HEENT: hearing intact, EOMI, clear sclera without icterus - Cardiovascular: regular, normal rate - Respiratory: normal effort - Integumentary: no edema Diabetic foot exam:  
 
Left Foot: 
 Visual Exam: normal  
 Pulse DP: 2+ (normal) Filament test: normal sensation - Psychiatric: normal mood and affect Data Reviewed:  
Component Latest Ref Rng & Units 6/15/2018 6/15/2018 3/15/2018 3/15/2018  
 
      2:56 PM  2:56 PM  4:53 PM  4:53 PM  
Glucose 65 - 99 mg/dL 129 (H) BUN 
    6 - 24 mg/dL 13 Creatinine 
    0.57 - 1.00 mg/dL 0.72 GFR est non-AA 
    >59 mL/min/1.73 93 GFR est AA 
    >59 mL/min/1.73 108 BUN/Creatinine ratio 9 - 23 18 Sodium 134 - 144 mmol/L 140 Potassium 3.5 - 5.2 mmol/L 4.1 Chloride 96 - 106 mmol/L 101 CO2 
    20 - 29 mmol/L 23 Calcium 8.7 - 10.2 mg/dL 9.3 Protein, total 
    6.0 - 8.5 g/dL Albumin 3.5 - 5.5 g/dL GLOBULIN, TOTAL 
    1.5 - 4.5 g/dL A-G Ratio 1.2 - 2.2 Bilirubin, total 
    0.0 - 1.2 mg/dL Alk. phosphatase 39 - 117 IU/L      
AST 
    0 - 40 IU/L      
ALT (SGPT) 0 - 32 IU/L Cholesterol, total 
    100 - 199 mg/dL    152 Triglyceride 0 - 149 mg/dL    156 (H) HDL Cholesterol >39 mg/dL    41  
VLDL, calculated 5 - 40 mg/dL    31 LDL, calculated 0 - 99 mg/dL    80 Creatinine, urine Not Estab. mg/dL   31.0 Microalbumin, urine Not Estab. ug/mL   7.3 Microalbumin/Creat. Ratio 
    0.0 - 30.0 mg/g creat   23.5 Hemoglobin A1c, (calculated) 4.8 - 5.6 %  9.1 (H) Estimated average glucose 
    mg/dL  214 Assessment/Plan: 1. Type 2 diabetes mellitus without complication, without long-term current use of insulin ([address] Control is good at times, but very poor at other times. Her and I agree that her diabetes control is largely diet related. Encouraged her to be more consistent with her diet. Recommended she eat out less and plan her meals better. Also strongly encouraged her to start exercising more regularly. Continue Levemir 80 units daily at night. Advised her to decrease his dose should she have values less than 90 Continue Victoza, metformin, Invokana. Labs today. 2. Mixed hyperlipidemia Continue atorvastatin and weight loss efforts 3. Essential hypertension Continue losartan 4. BMI 31.0-31.9,adult Weight loss via calorie restriction and exercise would help to substantially decrease insulin and diabetes medication needs. Patient Instructions Diabetes. Work on dietary, exercise and weight loss efforts Medications: 
Victoza 1.8 mg daily Combine metformin and Invokana Continue Levemir 80 units -  lower dose if you have lows (<90) Monitor more before dinner and at bedtime. Continue monitoring fasting. Goals for blood glucose: 
Fasting  (less than 150) Lunch, Dinner, Bedtime -  (less than 180). Blood pressure: 
Continue losartan   
 
Follow-up Disposition: 
Return in about 3 months (around 12/27/2018). Copy sent to:

## 2018-09-27 NOTE — MR AVS SNAPSHOT
[address] [facility] [address] 
[phone] Patient: Tyrone Alex MRN: [id number] VASQUEZ:[birth year] Visit Information Date & Time Provider Department Dept. Phone Encounter #  
 9/27/2018  3:50 PM Hiwot Matt, [address] [place] [address] [phone] Follow-up Instructions Return in about 3 months (around 12/27/2018). Upcoming Health Maintenance Date Due DTaP/Tdap/Td series (1 - Tdap) 3/2/1982 Shingrix Vaccine Age 50> (1 of 2) 3/2/2011 FOBT Q 1 YEAR AGE 50-75 3/4/2017 PAP AKA CERVICAL CYTOLOGY 3/2/2018 Influenza Age 5 to Adult 8/1/2018 HEMOGLOBIN A1C Q6M 12/15/2018 EYE EXAM RETINAL OR DILATED Q1 12/19/2018 FOOT EXAM Q1 3/15/2019 MICROALBUMIN Q1 3/15/2019 LIPID PANEL Q1 3/15/2019 BREAST CANCER SCRN MAMMOGRAM 6/21/2020 Allergies as of 9/27/2018  Review Complete On: 9/27/2018 By: Hiwot Matt MD  
 No Known Allergies Current Immunizations  Never Reviewed Name Date Influenza Vaccine 10/9/2014 Pneumococcal Vaccine (Unspecified Type) 10/9/2014 Not reviewed this visit You Were Diagnosed With   
  
 Codes Comments Type 2 diabetes mellitus without complication, without long-term current use of insulin (HCC)    -  Primary ICD-10-CM: E11.9 ICD-9-CM: 250.00 Mixed hyperlipidemia     ICD-10-CM: E78.2 ICD-9-CM: 272.2 Essential hypertension     ICD-10-CM: I10 
ICD-9-CM: 401.9 BMI 31.0-31.9,adult     ICD-10-CM: Z68.31 
ICD-9-CM: V85.31 Vitals BP Pulse Height(growth percentile) Weight(growth percentile) SpO2 BMI  
 137/79 75 5' 4\" (1.626 m) 183 lb 3.2 oz (83.1 kg) 99% 31.45 kg/m2 OB Status Smoking Status Menopause Former Smoker Vitals History BMI and BSA Data Body Mass Index Body Surface Area  
 31.45 kg/m 2 1.94 m 2 Preferred Pharmacy Pharmacy Name Phone [facility] [address] [facility] [address] [facility] [phone] Your Updated Medication List  
  
   
This list is accurate as of 9/27/18  4:17 PM.  Always use your most recent med list.  
  
  
  
  
 atorvastatin 20 mg tablet Commonly known as:  LIPITOR Take 1 Tab by mouth daily. econazole nitrate 1 % topical cream  
Commonly known as:  Deisy Bridget Apply  to affected area two (2) times a day. insulin detemir U-100 100 unit/mL (3 mL) Inpn Commonly known as:  LEVEMIR FLEXTOUCH  
70 Units by SubCUTAneous route nightly. insulin syringe-needle U-100 1 mL 31 gauge x 15/64\" Syrg 1 Each by SubCUTAneous route nightly. INVOKANA 100 mg tablet Generic drug:  canagliflozin TAKE ONE TABLET BY MOUTH DAILY Liraglutide 0.6 mg/0.1 mL (18 mg/3 mL) Pnij Commonly known as:  VICTOZA 3-KIMBERLY  
1.8 mg by SubCUTAneous route daily. losartan 100 mg tablet Commonly known as:  COZAAR  
TAKE ONE TABLET BY MOUTH DAILY  
  
 metFORMIN 500 mg tablet Commonly known as:  GLUCOPHAGE  
TAKE TWO TABLETS BY MOUTH TWICE A DAY WITH MEALS  
  
 * NOVOFINE 30 30 gauge x 1/3\" Generic drug:  Insulin Needles (Disposable) USE TO INJECT VICTOZA ONCE DAILY * Insulin Needles (Disposable) 30 gauge x 1/3\"  
0.3 ml subcutaneous daily * Insulin Needles (Disposable) 31 gauge x 1/4\" Ndle Take 1-3 times daily  
  
 omeprazole 40 mg capsule Commonly known as:  PRILOSEC Take 1 Cap by mouth daily. * Notice: This list has 3 medication(s) that are the same as other medications prescribed for you. Read the directions carefully, and ask your doctor or other care provider to review them with you. We Performed the Following COLLECTION VENOUS BLOOD,VENIPUNCTURE [id number] CPT(R)] HEMOGLOBIN A1C WITH EAG [38664 CPT(R)] LIPID PANEL [51978 CPT(R)] METABOLIC PANEL, COMPREHENSIVE [67741 CPT(R)] [place] HANDLG&/OR CONVEY OF SPEC FOR TR OFFICE TO LAB [50415 CPT(R)] Follow-up Instructions Return in about 3 months (around 12/27/2018). Patient Instructions Diabetes. Work on dietary, exercise and weight loss efforts Medications: 
Victoza 1.8 mg daily Combine metformin and Invokana Continue Levemir 80 units -  lower dose if you have lows (<90) Monitor more before dinner and at bedtime. Continue monitoring fasting. Goals for blood glucose: 
Fasting  (less than 150) Lunch, Dinner, Bedtime -  (less than 180). Blood pressure: 
Continue losartan 
 
  
 
 
  
Introducing MY[employer]! Dear Caridad Jernigan: Thank you for requesting a [employer] account. Our records indicate that you already have an active [employer] account. You can access your account anytime at [email or web address])/[employer] Did you know that you can access your hospital and ER discharge instructions at any time in [employer]? You can also review all of your test results from your hospital stay or ER visit. Additional Information If you have questions, please visit the Frequently Asked Questions section of the [employer] website at [email or web address] Remember, [employer] is NOT to be used for urgent needs. For medical emergencies, dial 911. Now available from your iPhone and Android! Please provide this summary of care documentation to your next provider. Your primary care clinician is listed as Doc Madrigal. If you have any questions after today's visit, please call [phone].

## 2018-09-28 LAB
ALBUMIN SERPL-MCNC: 4.1 G/DL (ref 3.5–5.5)
ALBUMIN/GLOB SERPL: 1.3 {RATIO} (ref 1.2–2.2)
ALP SERPL-CCNC: 79 IU/L (ref 39–117)
ALT SERPL-CCNC: 32 IU/L (ref 0–32)
AST SERPL-CCNC: 31 IU/L (ref 0–40)
BILIRUB SERPL-MCNC: <0.2 MG/DL (ref 0–1.2)
BUN SERPL-MCNC: 15 MG/DL (ref 6–24)
BUN/CREAT SERPL: 25 (ref 9–23)
CALCIUM SERPL-MCNC: 9 MG/DL (ref 8.7–10.2)
CHLORIDE SERPL-SCNC: 104 MMOL/L (ref 96–106)
CHOLEST SERPL-MCNC: 134 MG/DL (ref 100–199)
CO2 SERPL-SCNC: 21 MMOL/L (ref 20–29)
CREAT SERPL-MCNC: 0.59 MG/DL (ref 0.57–1)
EST. AVERAGE GLUCOSE BLD GHB EST-MCNC: 169 MG/DL
GLOBULIN SER CALC-MCNC: 3.1 G/DL (ref 1.5–4.5)
GLUCOSE SERPL-MCNC: 100 MG/DL (ref 65–99)
HBA1C MFR BLD: 7.5 % (ref 4.8–5.6)
HDLC SERPL-MCNC: 38 MG/DL
LDLC SERPL CALC-MCNC: 71 MG/DL (ref 0–99)
POTASSIUM SERPL-SCNC: 3.8 MMOL/L (ref 3.5–5.2)
PROT SERPL-MCNC: 7.2 G/DL (ref 6–8.5)
SODIUM SERPL-SCNC: 141 MMOL/L (ref 134–144)
TRIGL SERPL-MCNC: 123 MG/DL (ref 0–149)
VLDLC SERPL CALC-MCNC: 25 MG/DL (ref 5–40)

## 2018-10-11 ENCOUNTER — PATIENT OUTREACH (OUTPATIENT)
Dept: ENDOCRINOLOGY | Age: 58
End: 2018-10-11

## 2018-10-11 NOTE — PROGRESS NOTES
10/11/18 NN completed page 3 of patient assistance program (PAP) application for address update and refill for Victoza and Levemir medications. MD sign. NN faxed to [employer]. Scanned into chart.

## 2018-11-01 ENCOUNTER — PATIENT OUTREACH (OUTPATIENT)
Dept: ENDOCRINOLOGY | Age: 58
End: 2018-11-01

## 2018-11-01 NOTE — PROGRESS NOTES
18 NN called [facility] today, verified patient's name, address and . Estrella Nordisk states patient has received maximum refills for Victoza, but will be able to refill the Levemir, request placed today, 18. NN resent page 3 of patient assistance program (PAP) along with letter with updated office mailing address via fax to [facility].

## 2018-11-14 ENCOUNTER — HOSPITAL ENCOUNTER (EMERGENCY)
Age: 58
Discharge: HOME | End: 2018-11-14
Attending: EMERGENCY MEDICINE
Payer: SELF-PAY

## 2018-11-14 PROCEDURE — 75810000275 HC EMERGENCY DEPT VISIT NO LEVEL OF CARE

## 2018-11-15 ENCOUNTER — PATIENT OUTREACH (OUTPATIENT)
Dept: ENDOCRINOLOGY | Age: 58
End: 2018-11-15

## 2018-11-15 NOTE — PROGRESS NOTES
11/15/18 NN completed [facility]ill request form [facility] MD sign. NN faxed to [facility] [employer], see media

## 2018-11-27 ENCOUNTER — TELEPHONE (OUTPATIENT)
Dept: ENDOCRINOLOGY | Age: 58
End: 2018-11-27

## 2018-11-27 NOTE — TELEPHONE ENCOUNTER
Called patient to inform her that the Nurse Navigator spoke with [facility] [employer] on 11/1/18 and they informed her that Mrs. Anastasia Page has received the maximum refills for Victoza. Mrs. Anastasia Page did not answer the phone so I left a message asking for a return phone call.

## 2018-11-27 NOTE — TELEPHONE ENCOUNTER
Mrs. Sonia Spencer returned my phone call and I informed her of the information in regards to her Victoza from [facility] [employer]. Mrs. Sonia Spencer stated that she will be coming into the office on Thursday or Friday of this week to  her Levemir medication.

## 2018-12-06 ENCOUNTER — PATIENT OUTREACH (OUTPATIENT)
Dept: ENDOCRINOLOGY | Age: 58
End: 2018-12-06

## 2018-12-06 NOTE — PROGRESS NOTES
12/6/18  NN met with patient in office today. Patient picking up patient assistance medication, Levemir, see media for signature.

## 2018-12-21 ENCOUNTER — PATIENT OUTREACH (OUTPATIENT)
Dept: ENDOCRINOLOGY | Age: 58
End: 2018-12-21

## 2018-12-21 NOTE — PROGRESS NOTES
12/21/18  NN met with patient in office today. Patient completed patient assistance program (PAP) with [employer] for Levemir and Victoza medications. MD notified and signed application. NN faxed PAP to [employer].

## 2018-12-27 ENCOUNTER — OFFICE VISIT (OUTPATIENT)
Dept: ENDOCRINOLOGY | Age: 58
End: 2018-12-27

## 2018-12-27 VITALS
HEART RATE: 96 BPM | SYSTOLIC BLOOD PRESSURE: 132 MMHG | WEIGHT: 183 LBS | DIASTOLIC BLOOD PRESSURE: 84 MMHG | HEIGHT: 64 IN | BODY MASS INDEX: 31.24 KG/M2

## 2018-12-27 DIAGNOSIS — E11.9 TYPE 2 DIABETES MELLITUS WITHOUT COMPLICATION, WITH LONG-TERM CURRENT USE OF INSULIN (HCC): Primary | ICD-10-CM

## 2018-12-27 DIAGNOSIS — I10 ESSENTIAL HYPERTENSION: ICD-10-CM

## 2018-12-27 DIAGNOSIS — Z79.4 TYPE 2 DIABETES MELLITUS WITHOUT COMPLICATION, WITH LONG-TERM CURRENT USE OF INSULIN (HCC): Primary | ICD-10-CM

## 2018-12-27 DIAGNOSIS — E78.2 MIXED HYPERLIPIDEMIA: ICD-10-CM

## 2018-12-27 LAB — HBA1C MFR BLD HPLC: 10 %

## 2018-12-27 RX ORDER — PIOGLITAZONEHYDROCHLORIDE 15 MG/1
15 TABLET ORAL DAILY
Qty: 30 TAB | Refills: 10 | Status: SHIPPED | OUTPATIENT
Start: 2018-12-27 | End: 2019-11-12 | Stop reason: SDUPTHER

## 2018-12-27 RX ORDER — GUAIFENESIN 100 MG/5ML
81 LIQUID (ML) ORAL DAILY
COMMUNITY
End: 2018-12-27 | Stop reason: ALTCHOICE

## 2018-12-27 NOTE — PATIENT INSTRUCTIONS
Diabetes. Work on dietary, exercise and weight loss efforts    Medications:  Continue Victoza 1.8 mg daily  Continue metformin   Continue Invokana  Continue Levemir 80 units  ** lower dose if you have lows (<90)     ADD pioglitazone 15 mg daily. This will decrease insulin needs over a 1-3 week period. Lower Levemir dose if you have lows. Monitor more before dinner and at bedtime. Continue monitoring fasting. Goals for blood glucose:  Fasting  (less than 150)  Lunch, Dinner, Bedtime -  (less than 180). Blood pressure:  Continue losartan      Weight  - diet, exercise efforts    Cholesterol - continue atorvastatin.     Recommend stopping aspirin.

## 2018-12-27 NOTE — PROGRESS NOTES
History of Present Illness: Inessa Márquez is a 63 y.o. female presents for follow-up of diabetes. she has had diabetes since 2005. Also had gestational diabetes around age 41. She also has dyslipidemia, HTN and BMI of 30. A1c 10.0 with POC A1c in office    Diabetes related complications:  No microalbumin  No neuropathy sx. No known retinopathy. Current diabetes regimen:  Invokana 100 mg   Metformin 1000 mg twice daily  victoza 1.8 mg daily  Levemir -taking 80 units  in evenings. Weight is stable. Glucoses:  No lows. Lowest value was 105, but generally glucoses are very rarely < 150. Values often in 300s over last few weeks. Values were better in September and October - often < 200 during this time period     Diet:  Has had more stress and has had more food available around holiday period    Lipids atorvastatin 20 mg    BP: losartan 100 mg. Blood pressure is improved today    BMI 31 . Wt stable since last visit. Social;  She does not have insurance and planning on reapplying [address] She is getting her branded medications through patient assistance  She is currently working. Past Medical History:   Diagnosis Date    Diabetes ([address]     High cholesterol     Hypercholesteremia     Hypertension     Ill-defined condition     hyperlipidemia     Current Outpatient Medications   Medication Sig    aspirin 81 mg chewable tablet Take 81 mg by mouth daily.  metFORMIN (GLUCOPHAGE) 500 mg tablet TAKE TWO TABLETS BY MOUTH TWICE A DAY WITH MEALS    INVOKANA 100 mg tablet TAKE ONE TABLET BY MOUTH DAILY    insulin syringe-needle U-100 1 mL 31 gauge x 15/64\" syrg 1 Each by SubCUTAneous route nightly.  Liraglutide (VICTOZA 3-KIMBERLY) 0.6 mg/0.1 mL (18 mg/3 mL) pnij 1.8 mg by SubCUTAneous route daily.  insulin detemir (LEVEMIR FLEXTOUCH) 100 unit/mL (3 mL) inpn 70 Units by SubCUTAneous route nightly.     losartan (COZAAR) 100 mg tablet TAKE ONE TABLET BY MOUTH DAILY    Insulin Needles, Disposable, 31 gauge x 1/4\" ndle Take 1-3 times daily    NOVOFINE 30 30 gauge x 1/3\" USE TO INJECT VICTOZA ONCE DAILY    Insulin Needles, Disposable, 30 gauge x 1/3\" 0.3 ml subcutaneous daily    atorvastatin (LIPITOR) 20 mg tablet Take 1 Tab by mouth daily.  omeprazole (PRILOSEC) 40 mg capsule Take 1 Cap by mouth daily.  econazole nitrate (SPECTAZOLE) 1 % topical cream Apply  to affected area two (2) times a day. No current facility-administered medications for this visit.       No Known Allergies    Review of Systems:  - Eyes: no blurry vision or double vision  - Cardiovascular: no chest pain  - Respiratory: no shortness of breath  - Musculoskeletal: she does report some arthralgias and myalgias  - Neurological: no numbness/tingling in extremities    Physical Examination:  Visit Vitals  /84 (BP 1 Location: Right arm, BP Patient Position: Sitting)   Pulse 96   Ht 5' 4\" (1.626 m)   Wt 183 lb (83 kg)   BMI 31.41 kg/m²   -   - General: pleasant, no distress, normal gait   HEENT: hearing intact, EOMI, clear sclera without icterus  - Cardiovascular: regular, normal rate   - Respiratory: normal effort  - Integumentary: no edema  - Psychiatric: normal mood and affect    Data Reviewed:   Component      Latest Ref Rng & Units 12/27/2018 9/27/2018 9/27/2018 9/27/2018          11:00 AM  4:28 PM  4:28 PM  4:28 PM   Glucose      65 - 99 mg/dL    100 (H)   BUN      6 - 24 mg/dL    15   Creatinine      0.57 - 1.00 mg/dL    0.59   GFR est non-AA      >59 mL/min/1.73    102   GFR est AA      >59 mL/min/1.73    118   BUN/Creatinine ratio      9 - 23    25 (H)   Sodium      134 - 144 mmol/L    141   Potassium      3.5 - 5.2 mmol/L    3.8   Chloride      96 - 106 mmol/L    104   CO2      20 - 29 mmol/L    21   Calcium      8.7 - 10.2 mg/dL    9.0   Protein, total      6.0 - 8.5 g/dL    7.2   Albumin      3.5 - 5.5 g/dL    4.1   GLOBULIN, TOTAL      1.5 - 4.5 g/dL    3.1   A-G Ratio      1.2 - 2.2    1.3 Bilirubin, total      0.0 - 1.2 mg/dL    <0.2   Alk. phosphatase      39 - 117 IU/L    79   AST      0 - 40 IU/L    31   ALT (SGPT)      0 - 32 IU/L    32   Cholesterol, total      100 - 199 mg/dL  134     Triglyceride      0 - 149 mg/dL  123     HDL Cholesterol      >39 mg/dL  38 (L)     VLDL, calculated      5 - 40 mg/dL  25     LDL, calculated      0 - 99 mg/dL  71     Creatinine, urine      Not Estab. mg/dL       Microalbumin, urine      Not Estab. ug/mL       Microalbumin/Creat. Ratio      0.0 - 30.0 mg/g creat       Hemoglobin A1c, (calculated)      4.8 - 5.6 %   7.5 (H)    Estimated average glucose      mg/dL   169    Hemoglobin A1c (POC)      % 10.0          Component      Latest Ref Rng & Units 3/15/2018     Creatinine, urine      Not Estab. mg/dL 31.0   Microalbumin, urine      Not Estab. ug/mL 7.3   Microalbumin/Creat. Ratio      0.0 - 30.0 mg/g creat 23.5     Assessment/Plan:   1. Type 2 diabetes mellitus without complication, with long-term current use of insulin ([facility])   - not controlled - A1c 10.0 today. - she readily admits to the need for dietary improvements. She plans to start to improve diet. She also plans to join a gym and start exercising regularly  - Will add pioglitazone 15 mg daily. I think this is worth trying as she may respond very well. Pioglitazone will decrease insulin needs and prevent her from needing to take 2 Levemir injections daily. [facility] study showed pioglitazone + GLP-1 combination can be very effective in improving control. Some evidence suggests pioglitazone can enhance beta-cell regeneration. Reviewed weight gain related effects. Invokana tx should decrease fluid retention potential. Pioglitazone likely has CV benefit. Overall, feel positives outweigh negatives in her case. - continue metformin, Invokana, Victoza  - lower Levemir dose should she have values < 90   2.  Mixed hyperlipidemia   - continue atorvastatin  - weight loss, exercise, dietary efforts will improve triglycerides and HDL. Additionally, reviewed how pioglitazone will help in this area as well. 3. BMI 31.0-31.9,adult   - encouraged dietary efforts and resumption of regular exercise   4. Essential hypertension   - continue losartan     Patient Instructions   Diabetes. Work on dietary, exercise and weight loss efforts    Medications:  Continue Victoza 1.8 mg daily  Continue metformin   Continue Invokana  Continue Levemir 80 units  ** lower dose if you have lows (<90)     ADD pioglitazone 15 mg daily. This will decrease insulin needs over a 1-3 week period. Lower Levemir dose if you have lows. Monitor more before dinner and at bedtime. Continue monitoring fasting. Goals for blood glucose:  Fasting  (less than 150)  Lunch, Dinner, Bedtime -  (less than 180). Blood pressure:  Continue losartan      Weight  - diet, exercise efforts    Cholesterol - continue atorvastatin. Recommend stopping aspirin.         Follow-up Disposition:  Return in about 3 months (around 3/27/2019).     Copy sent to:

## 2018-12-28 ENCOUNTER — PATIENT OUTREACH (OUTPATIENT)
Dept: ENDOCRINOLOGY | Age: 58
End: 2018-12-28

## 2018-12-28 NOTE — PROGRESS NOTES
12/7/18 Patient was present to her follow up visit. 12/28/18 NN faxed completed patient assistance program (PAP) application and supporting documents to [employer] for approval, see media.

## 2019-01-10 ENCOUNTER — PATIENT OUTREACH (OUTPATIENT)
Dept: ENDOCRINOLOGY | Age: 59
End: 2019-01-10

## 2019-01-10 NOTE — PROGRESS NOTES
1/10/19 Patient in office today with patient assistance program application with [employer] for Invokana. Nurse navigator (NN) completed PAP application, forwarded to MD for review, signature and prescription. NN faxed PAP application to [employer]. 1/11/19 NN met with patient in office today. Patient picked up her PAP medication, see media.

## 2019-01-25 ENCOUNTER — PATIENT OUTREACH (OUTPATIENT)
Dept: ENDOCRINOLOGY | Age: 59
End: 2019-01-25

## 2019-01-25 NOTE — PROGRESS NOTES
1/25/19 Nurse navigator (NN) called patient today, no answer, left voicemail message to return telephone call. Patient was denied patient assistance program (PAP) with [employer] for [employer], see media.

## 2019-02-07 ENCOUNTER — TELEPHONE (OUTPATIENT)
Dept: ENDOCRINOLOGY | Age: 59
End: 2019-02-07

## 2019-03-28 ENCOUNTER — OFFICE VISIT (OUTPATIENT)
Dept: ENDOCRINOLOGY | Age: 59
End: 2019-03-28

## 2019-03-28 VITALS
WEIGHT: 178 LBS | HEART RATE: 77 BPM | DIASTOLIC BLOOD PRESSURE: 84 MMHG | SYSTOLIC BLOOD PRESSURE: 139 MMHG | BODY MASS INDEX: 30.39 KG/M2 | HEIGHT: 64 IN

## 2019-03-28 DIAGNOSIS — E78.2 MIXED HYPERLIPIDEMIA: ICD-10-CM

## 2019-03-28 DIAGNOSIS — Z79.4 TYPE 2 DIABETES MELLITUS WITHOUT COMPLICATION, WITH LONG-TERM CURRENT USE OF INSULIN (HCC): Primary | ICD-10-CM

## 2019-03-28 DIAGNOSIS — I10 ESSENTIAL HYPERTENSION: ICD-10-CM

## 2019-03-28 DIAGNOSIS — E08.01 DIABETES MELLITUS DUE TO UNDERLYING CONDITION WITH HYPEROSMOLARITY AND COMA, WITH LONG-TERM CURRENT USE OF INSULIN (HCC): ICD-10-CM

## 2019-03-28 DIAGNOSIS — Z79.4 DIABETES MELLITUS DUE TO UNDERLYING CONDITION WITH HYPEROSMOLARITY AND COMA, WITH LONG-TERM CURRENT USE OF INSULIN (HCC): ICD-10-CM

## 2019-03-28 DIAGNOSIS — E11.9 TYPE 2 DIABETES MELLITUS WITHOUT COMPLICATION, WITH LONG-TERM CURRENT USE OF INSULIN (HCC): Primary | ICD-10-CM

## 2019-03-28 LAB — HBA1C MFR BLD HPLC: 7.6 %

## 2019-03-28 NOTE — PATIENT INSTRUCTIONS
Diabetes. Work on dietary, exercise and weight loss efforts    Medications:  Continue Victoza 1.8 mg daily  Continue metformin   Continue pioglitazone    Decrease Levemir 70 units  ** lower dose in 5 unit increments every 3-7 days if you have values <90    Increase by 5 units if values are consistently > 130 fasting. Monitor more before dinner and at bedtime. Continue monitoring fasting. Goals for blood glucose:  Fasting  (less than 150)  Lunch, Dinner, Bedtime -  (less than 180).     Blood pressure:  Continue losartan    Weight  - diet, exercise efforts    Cholesterol - continue atorvastatin.

## 2019-03-28 NOTE — PROGRESS NOTES
History of Present Illness: Graeme Chapa is a 63 y.o. female presents for follow-up of diabetes. She has had diabetes since 2005. Also had gestational diabetes around age 41. She also has dyslipidemia, HTN and BMI of 30. A1c 7.6 prior to today's visit, down from 10.0 3 months ago. Diabetes related complications:  No microalbumin  No neuropathy sx. No known retinopathy. Current diabetes regimen:  Invokana 100 mg - ran out about 1 month ago. Metformin 1000 mg twice daily  victoza 1.8 mg daily  Levemir -taking 80 units  in evenings. Glucoses:  Brings log. Checking several times a day  More often low fasting - several values in the 50s-70s range in last week or so. Highest values in 200s when she has spaghetti night prior. Diet:  Doing better with her diet. Lipids atorvastatin 20 mg    BP: losartan 100 mg. Blood pressure is improved today    BMI 30 . Wt is down 5 lbs since last visit. .     Social;  She is getting Levemir and Victoza via patient assistance  She is currently working. Past Medical History:   Diagnosis Date    Diabetes ([address]     High cholesterol     Hypercholesteremia     Hypertension     Ill-defined condition     hyperlipidemia     Current Outpatient Medications   Medication Sig    pioglitazone (ACTOS) 15 mg tablet Take 1 Tab by mouth daily.  metFORMIN (GLUCOPHAGE) 500 mg tablet TAKE TWO TABLETS BY MOUTH TWICE A DAY WITH MEALS    insulin syringe-needle U-100 1 mL 31 gauge x 15/64\" syrg 1 Each by SubCUTAneous route nightly.  Liraglutide (VICTOZA 3-KIMBERLY) 0.6 mg/0.1 mL (18 mg/3 mL) pnij 1.8 mg by SubCUTAneous route daily.  insulin detemir (LEVEMIR FLEXTOUCH) 100 unit/mL (3 mL) inpn 70 Units by SubCUTAneous route nightly.     losartan (COZAAR) 100 mg tablet TAKE ONE TABLET BY MOUTH DAILY    Insulin Needles, Disposable, 31 gauge x 1/4\" ndle Take 1-3 times daily    NOVOFINE 30 30 gauge x 1/3\" USE TO INJECT VICTOZA ONCE DAILY    Insulin Needles, Disposable, 30 gauge x 1/3\" 0.3 ml subcutaneous daily    atorvastatin (LIPITOR) 20 mg tablet Take 1 Tab by mouth daily.  omeprazole (PRILOSEC) 40 mg capsule Take 1 Cap by mouth daily.  econazole nitrate (SPECTAZOLE) 1 % topical cream Apply  to affected area two (2) times a day.  INVOKANA 100 mg tablet TAKE ONE TABLET BY MOUTH DAILY     No current facility-administered medications for this visit. No Known Allergies    Review of Systems:  - Eyes: no blurry vision or double vision  - Cardiovascular: no chest pain  - Respiratory: no shortness of breath  - Musculoskeletal: no myalgias  - Neurological: no numbness/tingling in extremities    Physical Examination:  Visit Vitals  /84 (BP 1 Location: Left arm, BP Patient Position: Sitting)   Pulse 77   Ht 5' 4\" (1.626 m)   Wt 178 lb (80.7 kg)   BMI 30.55 kg/m²   -   - General: pleasant, no distress, normal gait   HEENT: hearing intact, EOMI, clear sclera without icterus  - Cardiovascular: regular, normal rate   - Respiratory: normal effort  - Integumentary: no edema  - Psychiatric: normal mood and affect    Data Reviewed:   Lab Results   Component Value Date/Time    Hemoglobin A1c 7.5 09/27/2018 04:28 PM      Lab Results   Component Value Date/Time    Sodium 141 09/27/2018 04:28 PM    Potassium 3.8 09/27/2018 04:28 PM    Creatinine 0.59 09/27/2018 04:28 PM    Microalb/Creat ratio (ug/mg creat.) 23.5 03/15/2018 04:53 PM        Lab Results   Component Value Date/Time    Cholesterol, total 134 09/27/2018 04:28 PM    HDL Cholesterol 38 09/27/2018 04:28 PM    LDL, calculated 71 09/27/2018 04:28 PM    Triglyceride 123 09/27/2018 04:28 PM      No results found for: TSH, FT4, TRALT, TMCLT, TSHEXT     Assessment/Plan:   1. Type 2 diabetes mellitus without complication, with long-term current use of insulin (HCC)   Control is improving. I suspect the globin A1c may still be coming down  Decrease Levemir to 70 units once daily.   If she continues to have values less than 90, recommend she decrease further in 5 unit increments  Victoza and metformin and pioglitazone  Encouraged continued efforts with diet and exercise. 2 Mixed hyperlipidemia   Continue atorvastatin. 3. BMI 30.0-30.9,adult   She is making good efforts with diet and exercise. Decreasing insulin to help with weight loss as well.   4 Essential hypertension   Continue losartan     Patient Instructions   Diabetes. Work on dietary, exercise and weight loss efforts    Medications:  Continue Victoza 1.8 mg daily  Continue metformin   Continue pioglitazone    Decrease Levemir 70 units  ** lower dose in 5 unit increments every 3-7 days if you have values <90    Increase by 5 units if values are consistently > 130 fasting. Monitor more before dinner and at bedtime. Continue monitoring fasting. Goals for blood glucose:  Fasting  (less than 150)  Lunch, Dinner, Bedtime -  (less than 180). Blood pressure:  Continue losartan    Weight  - diet, exercise efforts    Cholesterol - continue atorvastatin.         Follow-up and Dispositions    · Return in about 3 months (around 6/28/2019).            Copy sent to:

## 2019-04-04 RX ORDER — CALCIUM CARB/VITAMIN D3/VIT K1 500-100-40
TABLET,CHEWABLE ORAL
Qty: 100 SYRINGE | Refills: 0 | Status: SHIPPED | OUTPATIENT
Start: 2019-04-04 | End: 2019-06-20

## 2019-04-16 ENCOUNTER — TELEPHONE (OUTPATIENT)
Dept: ENDOCRINOLOGY | Age: 59
End: 2019-04-16

## 2019-04-16 NOTE — TELEPHONE ENCOUNTER
----- Message from Carmen Landaverde sent at 4/16/2019 10:41 AM EDT -----  Regarding: Dr Mulligan/rx  Pt (p) [phone], pt said she would like a return call back from the nurse regarding her Levemir medication, pt would not provide anymore information when asked.   Pt would like a call back today if possible

## 2019-04-16 NOTE — TELEPHONE ENCOUNTER
4/16/2019  12:01 PM      Please see message below from answering service regarding medication.           Thanks

## 2019-04-17 NOTE — TELEPHONE ENCOUNTER
Called pt mobile phone. X1 ID verified. Spoke with pt in regards to Victoza medication. Pt stated she needs a refill on medication through patient assistance. I advised her I would send this off today. No other questions or concerns voiced at this time.

## 2019-05-09 ENCOUNTER — TELEPHONE (OUTPATIENT)
Dept: ENDOCRINOLOGY | Age: 59
End: 2019-05-09

## 2019-05-09 NOTE — TELEPHONE ENCOUNTER
5/9/2019  1:09 PM      Please see message below from answering service regarding patient assistance.         Thanks

## 2019-05-09 NOTE — TELEPHONE ENCOUNTER
----- Message from Hung Teixeira sent at 5/9/2019 12:47 PM EDT -----  Regarding: /Telephone   Pt wanted to know the status of the levemir through the [employer] program. Best contact number is [phone].

## 2019-05-14 ENCOUNTER — TELEPHONE (OUTPATIENT)
Dept: ENDOCRINOLOGY | Age: 59
End: 2019-05-14

## 2019-05-15 ENCOUNTER — DOCUMENTATION ONLY (OUTPATIENT)
Dept: ENDOCRINOLOGY | Age: 59
End: 2019-05-15

## 2019-05-15 NOTE — PROGRESS NOTES
Called pt mobile phone yesterday [phone]. x1 ID verified. Advised pt to come in tomorrow to  samples. Pt stated she will come in on Thursday to  samples. No other questions or concerns voiced at this time.

## 2019-06-13 ENCOUNTER — OFFICE VISIT (OUTPATIENT)
Dept: OBGYN CLINIC | Age: 59
End: 2019-06-13

## 2019-06-13 VITALS — BODY MASS INDEX: 31.76 KG/M2 | WEIGHT: 186 LBS | HEIGHT: 64 IN

## 2019-06-13 DIAGNOSIS — Z01.419 WELL WOMAN EXAM WITH ROUTINE GYNECOLOGICAL EXAM: Primary | ICD-10-CM

## 2019-06-13 NOTE — PATIENT INSTRUCTIONS
Well Visit, Women 48 to 72: Care Instructions  Your Care Instructions    Physical exams can help you stay healthy. Your doctor has checked your overall health and may have suggested ways to take good care of yourself. He or she also may have recommended tests. At home, you can help prevent illness with healthy eating, regular exercise, and other steps. Follow-up care is a key part of your treatment and safety. Be sure to make and go to all appointments, and call your doctor if you are having problems. It's also a good idea to know your test results and keep a list of the medicines you take. How can you care for yourself at home? · Reach and stay at a healthy weight. This will lower your risk for many problems, such as obesity, diabetes, heart disease, and high blood pressure. · Get at least 30 minutes of exercise on most days of the week. Walking is a good choice. You also may want to do other activities, such as running, swimming, cycling, or playing tennis or team sports. · Do not smoke. Smoking can make health problems worse. If you need help quitting, talk to your doctor about stop-smoking programs and medicines. These can increase your chances of quitting for good. · Protect your skin from too much sun. When you're outdoors from 10 a.m. to 4 p.m., stay in the shade or cover up with clothing and a hat with a wide brim. Wear sunglasses that block UV rays. Even when it's cloudy, put broad-spectrum sunscreen (SPF 30 or higher) on any exposed skin. · See a dentist one or two times a year for checkups and to have your teeth cleaned. · Wear a seat belt in the car. · Limit alcohol to 1 drink a day. Too much alcohol can cause health problems. Follow your doctor's advice about when to have certain tests. These tests can spot problems early. · Cholesterol.  Your doctor will tell you how often to have this done based on your age, family history, or other things that can increase your risk for heart attack and stroke. · Blood pressure. Have your blood pressure checked during a routine doctor visit. Your doctor will tell you how often to check your blood pressure based on your age, your blood pressure results, and other factors. · Mammogram. Ask your doctor how often you should have a mammogram, which is an X-ray of your breasts. A mammogram can spot breast cancer before it can be felt and when it is easiest to treat. · Pap test and pelvic exam. Ask your doctor how often you should have a Pap test. You may not need to have a Pap test as often as you used to. · Vision. Have your eyes checked every year or two or as often as your doctor suggests. Some experts recommend that you have yearly exams for glaucoma and other age-related eye problems starting at age 48. · Hearing. Tell your doctor if you notice any change in your hearing. You can have tests to find out how well you hear. · Diabetes. Ask your doctor whether you should have tests for diabetes. · Colon cancer. You should begin tests for colon cancer at age 48. You may have one of several tests. Your doctor will tell you how often to have tests based on your age and risk. Risks include whether you already had a precancerous polyp removed from your colon or whether your parents, sisters and brothers, or children have had colon cancer. · Thyroid disease. Talk to your doctor about whether to have your thyroid checked as part of a regular physical exam. Women have an increased chance of a thyroid problem. · Osteoporosis. You should begin tests for bone density at age 72. If you are younger than 72, ask your doctor whether you have factors that may increase your risk for this disease. You may want to have this test before age 72. · Heart attack and stroke risk. At least every 4 to 6 years, you should have your risk for heart attack and stroke assessed.  Your doctor uses factors such as your age, blood pressure, cholesterol, and whether you smoke or have diabetes to show what your risk for a heart attack or stroke is over the next 10 years. When should you call for help? Watch closely for changes in your health, and be sure to contact your doctor if you have any problems or symptoms that concern you. Where can you learn more? Go to http://rudi-pretty.info/. Enter D435 in the search box to learn more about \"Well Visit, Women 50 to 72: Care Instructions. \"  Current as of: March 28, 2018  Content Version: 11.9  © 5010-1728 [employer]. Care instructions adapted under license by [employer] (which disclaims liability or warranty for this information). If you have questions about a medical condition or this instruction, always ask your healthcare professional. [address] any warranty or liability for your use of this information.

## 2019-06-13 NOTE — PROGRESS NOTES
Annual exam ages postmenopausal    Suze Sagastume is a No obstetric history on file. ,  63 y.o. female [facility] No LMP recorded. (Menstrual status: Menopause). .    She presents for her annual checkup. She is having labial pain, possible herpes outbreak. Known hx HSV; denies any lesions         Menstrual status:    Now menopausal      Sexual history:    She  reports that she does not currently engage in sexual activity. Medical conditions:    Since her last annual GYN exam about one year ago, she has not the following changes in her health history: none. Pap and Mammogram History:    Her most recent Pap smear was normal, obtained 1 year(s) ago. The patient had a recent mammogram 1 year ago which was negative for malignancy. Breast Cancer History/Substance Abuse: negative    Osteoporosis History:    Family history does not include a first or second degree relative with osteopenia or osteoporosis. Past Medical History:   Diagnosis Date    Diabetes ([address]     High cholesterol     Hypercholesteremia     Hypertension     Ill-defined condition     hyperlipidemia     Past Surgical History:   Procedure Laterality Date    HX GYN      D&C and 2 c-sections       Current Outpatient Medications   Medication Sig Dispense Refill    Insulin Syringe-Needle U-100 1 mL 31 gauge x 5/16 syrg USE TO INJECT INSULIN NIGHTLY 100 Syringe 0    pioglitazone (ACTOS) 15 mg tablet Take 1 Tab by mouth daily. 30 Tab 10    metFORMIN (GLUCOPHAGE) 500 mg tablet TAKE TWO TABLETS BY MOUTH TWICE A DAY WITH MEALS 360 Tab 3    insulin syringe-needle U-100 1 mL 31 gauge x 15/64\" syrg 1 Each by SubCUTAneous route nightly. 100 Pen Needle 3    Liraglutide (VICTOZA 3-KIMBERLY) 0.6 mg/0.1 mL (18 mg/3 mL) pnij 1.8 mg by SubCUTAneous route daily. 9 Pen 3    insulin detemir (LEVEMIR FLEXTOUCH) 100 unit/mL (3 mL) inpn 70 Units by SubCUTAneous route nightly.  75 mL 3    losartan (COZAAR) 100 mg tablet TAKE ONE TABLET BY MOUTH DAILY 30 Tab 3    Insulin Needles, Disposable, 31 gauge x 1/4\" ndle Take 1-3 times daily 100 Pen Needle 99    NOVOFINE 30 30 gauge x 1/3\" USE TO INJECT VICTOZA ONCE DAILY 100 Pen Needle 99    Insulin Needles, Disposable, 30 gauge x 1/3\" 0.3 ml subcutaneous daily 1 Package 11    atorvastatin (LIPITOR) 20 mg tablet Take 1 Tab by mouth daily. 90 Tab 4    omeprazole (PRILOSEC) 40 mg capsule Take 1 Cap by mouth daily. 30 Cap 12    econazole nitrate (SPECTAZOLE) 1 % topical cream Apply  to affected area two (2) times a day. 15 g 0     Allergies: Patient has no known allergies. Tobacco History:  reports that she quit smoking about 8 years ago. She has never used smokeless tobacco.  Alcohol Abuse:  reports that she does not drink alcohol. Drug Abuse:  reports that she does not use drugs.     Family Medical/Cancer History:   Family History   Problem Relation Age of Onset    Heart Disease Mother         had CHF    Cancer Other         colon ca    Diabetes Brother     Breast Cancer Sister 61        Review of Systems - History obtained from the patient  Constitutional: negative for weight loss, fever, night sweats  HEENT: negative for hearing loss, earache, congestion, snoring, sorethroat  CV: negative for chest pain, palpitations, edema  Resp: negative for cough, shortness of breath, wheezing  GI: negative for change in bowel habits, abdominal pain, black or bloody stools  : negative for frequency, dysuria, hematuria, vaginal discharge  MSK: negative for back pain, joint pain, muscle pain  Breast: negative for breast lumps, nipple discharge, galactorrhea  Skin :negative for itching, rash, hives  Neuro: negative for dizziness, headache, confusion, weakness  Psych: negative for anxiety, depression, change in mood  Heme/lymph: negative for bleeding, bruising, pallor    Physical Exam    Visit Vitals  Ht 5' 4\" (1.626 m)   Wt 186 lb (84.4 kg)   BMI 31.93 kg/m²       Constitutional  · Appearance: well-nourished, well developed, alert, in no acute distress    HENT  · Head and Face: appears normal    Chest  · Respiratory Effort: breathing unlabored      Breasts  · Inspection of Breasts: breasts symmetrical, no skin changes, no discharge present, nipple appearance normal, no skin retraction present  · Palpation of Breasts and Axillae: no masses present on palpation, no breast tenderness  · Axillary Lymph Nodes: no lymphadenopathy present    Gastrointestinal  · Abdominal Examination: abdomen non-tender to palpation, normal bowel sounds, no masses present  · Liver and spleen: no hepatomegaly present, spleen not palpable  · Hernias: no hernias identified    Skin  · General Inspection: no rash, no lesions identified    Neurologic/Psychiatric  · Mental Status:  · Orientation: grossly oriented to person, place and time  · Mood and Affect: mood normal, affect appropriate    Genitourinary  · External Genitalia: normal appearance for age, no discharge present, no tenderness present, no inflammatory lesions present, no masses present,  atrophy present  · Vagina: normal vaginal vault without central or paravaginal defects, no discharge present, no inflammatory lesions present, no masses present  · Bladder: non-tender to palpation  · Urethra: appears normal  · Cervix: normal   · Uterus: normal size, shape and consistency  · Adnexa: no adnexal tenderness present, no adnexal masses present  · Perineum: perineum within normal limits, no evidence of trauma, no rashes or skin lesions present  · Anus: anus within normal limits, no hemorrhoids present  · Inguinal Lymph Nodes: no lymphadenopathy present    Assessment:  Routine gynecologic examination  Her current medical status is satisfactory with no evidence of significant gynecologic issues.     Plan:  Pap done today  Counseled re: diet, exercise, healthy lifestyle  Return for yearly wellness visits  Rec annual mammogram

## 2019-06-17 LAB
CYTOLOGIST CVX/VAG CYTO: NORMAL
CYTOLOGY CVX/VAG DOC CYTO: NORMAL
CYTOLOGY CVX/VAG DOC THIN PREP: NORMAL
DX ICD CODE: NORMAL
LABCORP, 190119: NORMAL
Lab: NORMAL
OTHER STN SPEC: NORMAL
STAT OF ADQ CVX/VAG CYTO-IMP: NORMAL

## 2019-06-20 ENCOUNTER — OFFICE VISIT (OUTPATIENT)
Dept: ENDOCRINOLOGY | Age: 59
End: 2019-06-20

## 2019-06-20 VITALS
HEIGHT: 64 IN | OXYGEN SATURATION: 99 % | RESPIRATION RATE: 16 BRPM | SYSTOLIC BLOOD PRESSURE: 133 MMHG | WEIGHT: 189 LBS | HEART RATE: 74 BPM | DIASTOLIC BLOOD PRESSURE: 76 MMHG | BODY MASS INDEX: 32.27 KG/M2

## 2019-06-20 DIAGNOSIS — I10 ESSENTIAL HYPERTENSION: ICD-10-CM

## 2019-06-20 DIAGNOSIS — Z79.4 TYPE 2 DIABETES MELLITUS WITHOUT COMPLICATION, WITH LONG-TERM CURRENT USE OF INSULIN (HCC): Primary | ICD-10-CM

## 2019-06-20 DIAGNOSIS — E78.2 MIXED HYPERLIPIDEMIA: ICD-10-CM

## 2019-06-20 DIAGNOSIS — E11.9 TYPE 2 DIABETES MELLITUS WITHOUT COMPLICATION, WITH LONG-TERM CURRENT USE OF INSULIN (HCC): Primary | ICD-10-CM

## 2019-06-20 NOTE — PATIENT INSTRUCTIONS
Diabetes. Work on dietary, exercise and weight loss efforts    Medications:  Continue Victoza 1.8 mg daily  Continue metformin   Continue pioglitazone    Levemir 70 units  ** lower dose in 5 unit increments every 3-7 days if you have values <90    Increase by 5 units if values are consistently > 130 fasting. Monitor more before dinner and at bedtime. Continue monitoring fasting. Goals for blood glucose:  Fasting  (less than 150)  Lunch, Dinner, Bedtime -  (less than 180). Blood pressure:  Continue losartan    Weight  - diet, exercise efforts    Phentermine -   Start with 1/2 tablet - 4 mg. Increase weekly as tolerated to 8 mg, 12 mg, 16 mg  Take early in the morning. Let me know if you have any problems with increase heart rate, blood pressure, anxiety or trouble sleeping as these can be side effects.  If symptoms are mild, you body should acclimate to this and any related symptoms should improve.       Cholesterol - continue atorvastatin.

## 2019-06-20 NOTE — PROGRESS NOTES
History of Present Illness: Dasia Benavidez is a 63 y.o. female presents for follow-up of diabetes. She has had diabetes since 2005. Also had gestational diabetes around age 41. She also has dyslipidemia, HTN and BMI of 30. A1c 7.6 in March, down from 10.0 in 12/2018    Diabetes related complications:  No microalbumin  No neuropathy sx. No known retinopathy. Current diabetes regimen:  Metformin 1000 mg twice daily  victoza 1.8 mg daily  Levemir -taking 80 units  in evenings. Glucoses:  Brings meter. Checking primarily fasting, but occasionally checks at other times. Most values are in the  range, but some values are in the low 200s. Diet:  Diet is worse with stress. Lipids atorvastatin 20 mg    BP: losartan 100 mg. BMI 30 . Wt is up 11 lbs. Social;  She is getting Levemir and Victoza via patient assistance  She is currently working. Her daughter is at an internship in environmental science in [place]. She drove to [place]. She misses her daughter. Past Medical History:   Diagnosis Date    Diabetes ([facility] [address]     Herpes genitalis     High cholesterol     Hypercholesteremia     Hypertension     Ill-defined condition     hyperlipidemia     Current Outpatient Medications   Medication Sig    pioglitazone (ACTOS) 15 mg tablet Take 1 Tab by mouth daily.  metFORMIN (GLUCOPHAGE) 500 mg tablet TAKE TWO TABLETS BY MOUTH TWICE A DAY WITH MEALS    insulin syringe-needle U-100 1 mL 31 gauge x 15/64\" syrg 1 Each by SubCUTAneous route nightly.  Liraglutide (VICTOZA 3-KIMBERLY) 0.6 mg/0.1 mL (18 mg/3 mL) pnij 1.8 mg by SubCUTAneous route daily.  insulin detemir (LEVEMIR FLEXTOUCH) 100 unit/mL (3 mL) inpn 70 Units by SubCUTAneous route nightly. (Patient taking differently: 70 Units by SubCUTAneous route nightly.  Indications: type 2 diabetes mellitus)    losartan (COZAAR) 100 mg tablet TAKE ONE TABLET BY MOUTH DAILY    Insulin Needles, Disposable, 31 gauge x 1/4\" ndle Take 1-3 times daily    NOVOFINE 30 30 gauge x 1/3\" USE TO INJECT VICTOZA ONCE DAILY    Insulin Needles, Disposable, 30 gauge x 1/3\" 0.3 ml subcutaneous daily    atorvastatin (LIPITOR) 20 mg tablet Take 1 Tab by mouth daily.  omeprazole (PRILOSEC) 40 mg capsule Take 1 Cap by mouth daily.  Insulin Syringe-Needle U-100 1 mL 31 gauge x 5/16 syrg USE TO INJECT INSULIN NIGHTLY    econazole nitrate (SPECTAZOLE) 1 % topical cream Apply  to affected area two (2) times a day. No current facility-administered medications for this visit. No Known Allergies    Review of Systems:  - Eyes: no blurry vision or double vision  - Cardiovascular: no chest pain  - Respiratory: no shortness of breath  - Musculoskeletal: no myalgias  - Neurological: no numbness/tingling in extremities    Physical Examination:  Visit Vitals  /76   Pulse 74   Resp 16   Ht 5' 4\" (1.626 m)   Wt 189 lb (85.7 kg)   SpO2 99%   BMI 32.44 kg/m²   -   - General: pleasant, no distress, normal gait   HEENT: hearing intact, EOMI, clear sclera without icterus  - Cardiovascular: regular, normal rate   - Respiratory: normal effort  - Integumentary: no edema  - Psychiatric: normal mood and affect    Data Reviewed:   Lab Results   Component Value Date/Time    Hemoglobin A1c 7.5 09/27/2018 04:28 PM      Lab Results   Component Value Date/Time    Sodium 141 09/27/2018 04:28 PM    Potassium 3.8 09/27/2018 04:28 PM    Creatinine 0.59 09/27/2018 04:28 PM    Microalb/Creat ratio (ug/mg creat.) 23.5 03/15/2018 04:53 PM        Lab Results   Component Value Date/Time    Cholesterol, total 134 09/27/2018 04:28 PM    HDL Cholesterol 38 09/27/2018 04:28 PM    LDL, calculated 71 09/27/2018 04:28 PM    Triglyceride 123 09/27/2018 04:28 PM      No results found for: TSH, FT4, TRALT, TMCLT, TSHEXT     Assessment/Plan:   1.  Type 2 diabetes mellitus without complication, with long-term current use of insulin (HCC)   Diabetes appears to be reasonably well controlled  Continue metformin and pioglitazone and Victoza  Continue Levemir 70 units daily. Advised her to lower dose should she have values less than 90. Encouraged weight loss efforts to decrease insulin needs   2. BMI 32.0-32.9,adult   She is struggling with her appetite and controlling portions  And phentermine 8 mg tablets-start with 1 mg/day and increase as tolerated to 60 mg daily     3. Mixed hyperlipidemia   Continue atorvastatin weight loss efforts   4. Essential hypertension   Acceptable control. Continue losartan     Patient Instructions   Diabetes. Work on dietary, exercise and weight loss efforts    Medications:  Continue Victoza 1.8 mg daily  Continue metformin   Continue pioglitazone    Levemir 70 units  ** lower dose in 5 unit increments every 3-7 days if you have values <90    Increase by 5 units if values are consistently > 130 fasting. Monitor more before dinner and at bedtime. Continue monitoring fasting. Goals for blood glucose:  Fasting  (less than 150)  Lunch, Dinner, Bedtime -  (less than 180). Blood pressure:  Continue losartan    Weight  - diet, exercise efforts    Phentermine -   Start with 1/2 tablet - 4 mg. Increase weekly as tolerated to 8 mg, 12 mg, 16 mg  Take early in the morning. Let me know if you have any problems with increase heart rate, blood pressure, anxiety or trouble sleeping as these can be side effects. If symptoms are mild, you body should acclimate to this and any related symptoms should improve.       Cholesterol - continue atorvastatin.           Follow-up and Dispositions    · Return in about 4 months (around 10/20/2019).            Copy sent to:

## 2019-06-20 NOTE — PROGRESS NOTES
Lab Results   Component Value Date/Time    Hemoglobin A1c 7.5 (H) 09/27/2018 04:28 PM    Hemoglobin A1c 9.1 (H) 06/15/2018 02:56 PM    Hemoglobin A1c 8.5 (H) 03/15/2018 04:53 PM     Lab Results   Component Value Date/Time    Cholesterol, total 134 09/27/2018 04:28 PM    HDL Cholesterol 38 (L) 09/27/2018 04:28 PM    LDL, calculated 71 09/27/2018 04:28 PM    VLDL, calculated 25 09/27/2018 04:28 PM    Triglyceride 123 09/27/2018 04:28 PM     Diabetic Foot and Eye Exam HM Status   Topic Date Due    Diabetic Foot Care  09/27/2019    Eye Exam  12/19/2019

## 2019-06-27 ENCOUNTER — HOSPITAL ENCOUNTER (OUTPATIENT)
Dept: MAMMOGRAPHY | Age: 59
Discharge: HOME | End: 2019-06-27
Attending: FAMILY MEDICINE
Payer: COMMERCIAL

## 2019-06-27 DIAGNOSIS — Z12.39 BREAST SCREENING, UNSPECIFIED: ICD-10-CM

## 2019-06-27 PROCEDURE — 77067 SCR MAMMO BI INCL CAD: CPT

## 2019-08-22 ENCOUNTER — TELEPHONE (OUTPATIENT)
Dept: ENDOCRINOLOGY | Age: 59
End: 2019-08-22

## 2019-08-22 DIAGNOSIS — E11.9 TYPE 2 DIABETES MELLITUS WITHOUT COMPLICATION, WITH LONG-TERM CURRENT USE OF INSULIN (HCC): Primary | ICD-10-CM

## 2019-08-22 DIAGNOSIS — Z79.4 TYPE 2 DIABETES MELLITUS WITHOUT COMPLICATION, WITH LONG-TERM CURRENT USE OF INSULIN (HCC): Primary | ICD-10-CM

## 2019-08-22 NOTE — TELEPHONE ENCOUNTER
Called pt to inform them that Levemir and needles from [facility]AP had arrived. Pt stated she will be in tomorrow to pick these items up. Also stated that she would like Dr. Charo Lee to start following her cholesterol labs. Advised her I would put in future labs order for her to have done before her upcoming appointment.

## 2019-09-13 LAB
ALBUMIN SERPL-MCNC: 4.3 G/DL (ref 3.5–5.5)
ALBUMIN/CREAT UR: 15.8 MG/G CREAT (ref 0–30)
ALBUMIN/GLOB SERPL: 1.6 {RATIO} (ref 1.2–2.2)
ALP SERPL-CCNC: 82 IU/L (ref 39–117)
ALT SERPL-CCNC: 25 IU/L (ref 0–32)
AST SERPL-CCNC: 33 IU/L (ref 0–40)
BILIRUB SERPL-MCNC: <0.2 MG/DL (ref 0–1.2)
BUN SERPL-MCNC: 17 MG/DL (ref 6–24)
BUN/CREAT SERPL: 20 (ref 9–23)
CALCIUM SERPL-MCNC: 9.2 MG/DL (ref 8.7–10.2)
CHLORIDE SERPL-SCNC: 104 MMOL/L (ref 96–106)
CHOLEST SERPL-MCNC: 133 MG/DL (ref 100–199)
CO2 SERPL-SCNC: 20 MMOL/L (ref 20–29)
CREAT SERPL-MCNC: 0.83 MG/DL (ref 0.57–1)
CREAT UR-MCNC: 161.7 MG/DL
EST. AVERAGE GLUCOSE BLD GHB EST-MCNC: 180 MG/DL
GLOBULIN SER CALC-MCNC: 2.7 G/DL (ref 1.5–4.5)
GLUCOSE SERPL-MCNC: 113 MG/DL (ref 65–99)
HBA1C MFR BLD: 7.9 % (ref 4.8–5.6)
HDLC SERPL-MCNC: 40 MG/DL
LDLC SERPL CALC-MCNC: 72 MG/DL (ref 0–99)
MICROALBUMIN UR-MCNC: 25.5 UG/ML
POTASSIUM SERPL-SCNC: 4.9 MMOL/L (ref 3.5–5.2)
PROT SERPL-MCNC: 7 G/DL (ref 6–8.5)
SODIUM SERPL-SCNC: 142 MMOL/L (ref 134–144)
TRIGL SERPL-MCNC: 107 MG/DL (ref 0–149)
VLDLC SERPL CALC-MCNC: 21 MG/DL (ref 5–40)

## 2019-09-19 ENCOUNTER — OFFICE VISIT (OUTPATIENT)
Dept: ENDOCRINOLOGY | Age: 59
End: 2019-09-19

## 2019-09-19 VITALS
HEIGHT: 64 IN | HEART RATE: 87 BPM | SYSTOLIC BLOOD PRESSURE: 133 MMHG | WEIGHT: 199.2 LBS | DIASTOLIC BLOOD PRESSURE: 83 MMHG | BODY MASS INDEX: 34.01 KG/M2

## 2019-09-19 DIAGNOSIS — E11.9 TYPE 2 DIABETES MELLITUS WITHOUT COMPLICATION, WITH LONG-TERM CURRENT USE OF INSULIN (HCC): Primary | ICD-10-CM

## 2019-09-19 DIAGNOSIS — E78.2 MIXED HYPERLIPIDEMIA: ICD-10-CM

## 2019-09-19 DIAGNOSIS — Z79.4 TYPE 2 DIABETES MELLITUS WITHOUT COMPLICATION, WITH LONG-TERM CURRENT USE OF INSULIN (HCC): Primary | ICD-10-CM

## 2019-09-19 DIAGNOSIS — I10 ESSENTIAL HYPERTENSION: ICD-10-CM

## 2019-09-19 RX ORDER — ATORVASTATIN CALCIUM 20 MG/1
20 TABLET, FILM COATED ORAL DAILY
Qty: 90 TAB | Refills: 4 | Status: SHIPPED | OUTPATIENT
Start: 2019-09-19 | End: 2020-10-14 | Stop reason: SDUPTHER

## 2019-09-19 NOTE — PROGRESS NOTES
History of Present Illness: Chavez Black is a 63 y.o. female presents for follow-up of diabetes. She has had diabetes since 2005. Also had gestational diabetes around age 41. She also has dyslipidemia, HTN and BMI of 34     A1c 7.9 9/2019, up from 7.6 in March, down from 10.0 in 12/2018    Diabetes related complications:  No microalbumin  No neuropathy sx. No known retinopathy. Current diabetes regimen:  Metformin 1000 mg twice daily  Pioglitazone 15 mg   victoza 1.8 mg daily  Levemir -taking 70 units  in evenings. Glucoses:  Reviewed meter. Generally monitoring fasting, but checks afternoon on occasion  Values are 100-low 200s with values often in low 100s fasting. Values in afternoon can be in mid to high 100s. Diet:  M, T, Wed - up at 5:30 am  Breakfast - eggs, nelson, toast.  Lunch: often will eat out. Chicken sandwith with fries (chik amando a)  DInner; spaghetti last night   Snack - often has ice cream.   She is eating candy corn recently    Cutting back on red meat. Lipids atorvastatin 20 mg    BP: losartan 100 mg. BMI 30 . Wt is up 11 lbs vs 3 months ago and 22 lbs vs 6 months ago. Social;  She is getting Levemir and Victoza via patient assistance  She is currently working. Her daughter is at an internship in environmental science in [place] [address]. She drove to [place]. She misses her daughter. Past Medical History:   Diagnosis Date    Diabetes ([facility] [address]     Herpes genitalis     High cholesterol     Hypercholesteremia     Hypertension     Ill-defined condition     hyperlipidemia     Current Outpatient Medications   Medication Sig    TRUEPLUS INSULIN 1 mL 31 gauge x 5/16 syrg USE TO INJECT INSULIN EVERY EVENING    pioglitazone (ACTOS) 15 mg tablet Take 1 Tab by mouth daily.  metFORMIN (GLUCOPHAGE) 500 mg tablet TAKE TWO TABLETS BY MOUTH TWICE A DAY WITH MEALS    insulin syringe-needle U-100 1 mL 31 gauge x 15/64\" syrg 1 Each by SubCUTAneous route nightly.     Liraglutide (VICTOZA 3-KIMBERLY) 0.6 mg/0.1 mL (18 mg/3 mL) pnij 1.8 mg by SubCUTAneous route daily.  insulin detemir (LEVEMIR FLEXTOUCH) 100 unit/mL (3 mL) inpn 70 Units by SubCUTAneous route nightly. (Patient taking differently: 70 Units by SubCUTAneous route nightly. Indications: type 2 diabetes mellitus)    losartan (COZAAR) 100 mg tablet TAKE ONE TABLET BY MOUTH DAILY    Insulin Needles, Disposable, 31 gauge x 1/4\" ndle Take 1-3 times daily    NOVOFINE 30 30 gauge x 1/3\" USE TO INJECT VICTOZA ONCE DAILY    Insulin Needles, Disposable, 30 gauge x 1/3\" 0.3 ml subcutaneous daily    atorvastatin (LIPITOR) 20 mg tablet Take 1 Tab by mouth daily.  omeprazole (PRILOSEC) 40 mg capsule Take 1 Cap by mouth daily.  econazole nitrate (SPECTAZOLE) 1 % topical cream Apply  to affected area two (2) times a day.  phentermine (LOMAIRA) 8 mg tab Take up to 16 mg daily as directed. No current facility-administered medications for this visit. No Known Allergies    Review of Systems:  - Eyes: no blurry vision or double vision  - Cardiovascular: no chest pain  - Respiratory: no shortness of breath  - Musculoskeletal: + arthralgias in knees.    - Neurological: no numbness/tingling in extremities    Physical Examination:  Visit Vitals  /83 (BP 1 Location: Left arm, BP Patient Position: Sitting)   Pulse 87   Ht 5' 4\" (1.626 m)   Wt 199 lb 3.2 oz (90.4 kg)   BMI 34.19 kg/m²   -   - General: pleasant, no distress, normal gait   HEENT: hearing intact, EOMI, clear sclera without icterus  - Neck: no thyromegaly or LAD  - Cardiovascular: regular, normal rate   - Respiratory: normal effort  - Integumentary: no edema  - Psychiatric: normal mood and affect    Data Reviewed:   Lab Results   Component Value Date/Time    Hemoglobin A1c 7.9 09/12/2019 02:55 PM      Lab Results   Component Value Date/Time    Sodium 142 09/12/2019 02:55 PM    Potassium 4.9 09/12/2019 02:55 PM    Creatinine 0.83 09/12/2019 02:55 PM    Microalb/Creat ratio (ug/mg creat.) 15.8 09/12/2019 02:55 PM        Lab Results   Component Value Date/Time    Cholesterol, total 133 09/12/2019 02:55 PM    HDL Cholesterol 40 09/12/2019 02:55 PM    LDL, calculated 72 09/12/2019 02:55 PM    Triglyceride 107 09/12/2019 02:55 PM      No results found for: TSH, FT4, TRALT, TMCLT, TSHEXT     Assessment/Plan:   1. Type 2 diabetes mellitus without complication, with long-term current use of insulin (HCC)   - not controlled. Her and I agree diabetes control would be good with reasonable dietary efforts and small changes. She will work on decreasing portions with dinner and avoiding desserts/snacks afterwards. Recommended bulk of calories be with breakfast and lunch  Continue Levemir 80 units - lower dose for values < 100  - continue metformin, Victoza and pioglitazone. Encouraged weight loss efforts via lower calorie diet. 2. Mixed hyperlipidemia - continue atorvastatin and wt loss efforts   3. Essential hypertension - continue losartan and wt loss efforts   4. BMI 34.0-34.9,adult   - she has improvements with regards to candy, desserts, and portions. Patient Instructions   Diabetes. Work on dietary, exercise and weight loss efforts    Medications:  Continue Victoza 1.8 mg daily  Continue metformin   Continue pioglitazone    Levemir 70 units  ** lower dose in 5 unit increments every 3-7 days if you have values <90    Monitor more before dinner and at bedtime. Continue monitoring fasting. Goals for blood glucose:  Fasting  (less than 150)  Lunch, Dinner, Bedtime -  (less than 180). Blood pressure:  Continue losartan    Weight  Eat out less  Really focus on intake in the evening time. Cholesterol - continue atorvastatin.           Follow-up and Dispositions    · Return in about 3 months (around 12/19/2019).

## 2019-09-19 NOTE — PATIENT INSTRUCTIONS
Diabetes. Work on dietary, exercise and weight loss efforts    Medications:  Continue Victoza 1.8 mg daily  Continue metformin   Continue pioglitazone    Levemir 70 units  ** lower dose in 5 unit increments every 3-7 days if you have values <90    Monitor more before dinner and at bedtime. Continue monitoring fasting. Goals for blood glucose:  Fasting  (less than 150)  Lunch, Dinner, Bedtime -  (less than 180). Blood pressure:  Continue losartan    Weight  Eat out less  Really focus on intake in the evening time.       Cholesterol - continue atorvastatin.

## 2019-12-23 ENCOUNTER — OFFICE VISIT (OUTPATIENT)
Dept: ENDOCRINOLOGY | Age: 59
End: 2019-12-23

## 2019-12-23 VITALS
HEIGHT: 64 IN | HEART RATE: 83 BPM | WEIGHT: 195.8 LBS | BODY MASS INDEX: 33.43 KG/M2 | SYSTOLIC BLOOD PRESSURE: 155 MMHG | DIASTOLIC BLOOD PRESSURE: 93 MMHG

## 2019-12-23 DIAGNOSIS — E78.2 MIXED HYPERLIPIDEMIA: ICD-10-CM

## 2019-12-23 DIAGNOSIS — E11.9 TYPE 2 DIABETES MELLITUS WITHOUT COMPLICATION, WITH LONG-TERM CURRENT USE OF INSULIN (HCC): Primary | ICD-10-CM

## 2019-12-23 DIAGNOSIS — I10 ESSENTIAL HYPERTENSION: ICD-10-CM

## 2019-12-23 DIAGNOSIS — Z79.4 TYPE 2 DIABETES MELLITUS WITHOUT COMPLICATION, WITH LONG-TERM CURRENT USE OF INSULIN (HCC): Primary | ICD-10-CM

## 2019-12-23 NOTE — PROGRESS NOTES
History of Present Illness: Bebeto Zhou is a 63 y.o. female presents for follow-up of diabetes. She has had diabetes since 2005. Prior history of gestational diabetes around age 41. She also has dyslipidemia, HTN and BMI of 34     A1c 7.9 9/2019    Diabetes related complications:  No microalbumin  No neuropathy sx. No known retinopathy. Current diabetes regimen:  Metformin 1000 mg twice daily  Pioglitazone 15 mg   victoza 1.8 mg daily  Levemir -taking 70 units  in evenings. Glucoses:  Reviewed meter. Generally monitoring fasting, but checks afternoon on occasion  Values were in 90s to low 100s fasting in October. Recently have been in higher 100s to low 200s. Diet:  Eating more with stress. Having more pasta. Notes these spike glucoses. Was walking after last visit, but then stopped due to knee pain. Lipids atorvastatin 20 mg    BP: losartan 100 mg. Higher today. BMI 33     Social;  She is getting Levemir and Victoza via patient assistance  She is currently working. Doing transcription. Plans to find another job which will give her less stress. No smoking since 2011. Past Medical History:   Diagnosis Date    Diabetes ([facility] [address]     Herpes genitalis     High cholesterol     Hypercholesteremia     Hypertension     Ill-defined condition     hyperlipidemia     Current Outpatient Medications   Medication Sig    pioglitazone (ACTOS) 15 mg tablet TAKE 1 TABLET BY MOUTH EVERY DAY    metFORMIN (GLUCOPHAGE) 500 mg tablet TAKE TWO TABLETS BY MOUTH TWICE A DAY MEALS MEALS    atorvastatin (LIPITOR) 20 mg tablet Take 1 Tab by mouth daily.  TRUEPLUS INSULIN 1 mL 31 gauge x 5/16 syrg USE TO INJECT INSULIN EVERY EVENING    insulin syringe-needle U-100 1 mL 31 gauge x 15/64\" syrg 1 Each by SubCUTAneous route nightly.  Liraglutide (VICTOZA 3-KIMBERLY) 0.6 mg/0.1 mL (18 mg/3 mL) pnij 1.8 mg by SubCUTAneous route daily.     insulin detemir (LEVEMIR FLEXTOUCH) 100 unit/mL (3 mL) inpn 70 Units by SubCUTAneous route nightly. (Patient taking differently: 70 Units by SubCUTAneous route nightly. Indications: type 2 diabetes mellitus)    losartan (COZAAR) 100 mg tablet TAKE ONE TABLET BY MOUTH DAILY    Insulin Needles, Disposable, 31 gauge x 1/4\" ndle Take 1-3 times daily    NOVOFINE 30 30 gauge x 1/3\" USE TO INJECT VICTOZA ONCE DAILY    Insulin Needles, Disposable, 30 gauge x 1/3\" 0.3 ml subcutaneous daily    omeprazole (PRILOSEC) 40 mg capsule Take 1 Cap by mouth daily.  econazole nitrate (SPECTAZOLE) 1 % topical cream Apply  to affected area two (2) times a day. No current facility-administered medications for this visit.       No Known Allergies    Review of Systems:  - Eyes: no blurry vision or double vision  - Cardiovascular: no chest pain  - Respiratory: no shortness of breath  - Musculoskeletal: see hPI   - Neurological: no numbness/tingling in extremities    Physical Examination:  Visit Vitals  BP (!) 155/93 (BP 1 Location: Left arm, BP Patient Position: Sitting)   Pulse 83   Ht 5' 4\" (1.626 m)   Wt 195 lb 12.8 oz (88.8 kg)   BMI 33.61 kg/m²   -   - General: pleasant, no distress, normal gait   HEENT: hearing intact, EOMI, clear sclera without icterus  - Cardiovascular: regular, normal rate   - Respiratory: normal effort  - Integumentary: no edema   Diabetic foot exam:     Left Foot:   Visual Exam: normal    Pulse DP: 2+ (normal)   Filament test: normal sensation        - Psychiatric: normal mood and affect    Data Reviewed:   Lab Results   Component Value Date/Time    Hemoglobin A1c 7.9 09/12/2019 02:55 PM      Lab Results   Component Value Date/Time    Sodium 142 09/12/2019 02:55 PM    Potassium 4.9 09/12/2019 02:55 PM    Creatinine 0.83 09/12/2019 02:55 PM    Microalb/Creat ratio (ug/mg creat.) 15.8 09/12/2019 02:55 PM        Lab Results   Component Value Date/Time    Cholesterol, total 133 09/12/2019 02:55 PM    HDL Cholesterol 40 09/12/2019 02:55 PM    LDL, calculated 72 09/12/2019 02:55 PM    Triglyceride 107 09/12/2019 02:55 PM      No results found for: TSH, FT4, TRALT, TMCLT, TSHEXT     Assessment/Plan:   1. Type 2 diabetes mellitus without complication, with long-term current use of insulin (HCC)   Not controlled. She feels control is highly dependent on her diet. This does appear to be the case. After last visit glucoses were well controlled for several weeks, until the Thanksgiving/holiday period of time approached. She recognizes the need to decrease portion of Posta and other starches. Resumption of exercise would be greatly helpful as well. Encouraged her to address her stress, which seems to have a strong bearing on her glucose control and diet. Increase Levemir to 80 units. Advised her to lower this dose should she have values closer to 100. Continue Victoza, pioglitazone, metformin     2. Mixed hyperlipidemia   Continue atorvastatin   3. Essential hypertension   Continue losartan. Discussed that a second agent may be needed, however we will see how she responds to improving her stress level. 4. BMI 33.0-33.9,adult   Encouraged diet and exercise efforts. Patient Instructions   Diabetes. Work on diet, exercise and weight loss efforts    Medications:  Continue Victoza 1.8 mg daily  Continue metformin   Continue pioglitazone    Increase Levemir to 80 units. Reassess. Lower dose for values < 100 or 90. Monitor more before dinner and at bedtime. Continue monitoring fasting. Goals for blood glucose:  Fasting  (less than 150)  Lunch, Dinner, Bedtime -  (less than 180). Blood pressure:  Continue losartan    Cholesterol - continue atorvastatin.         Endocrinology options in [facility]: Wilfredo Rebol[place]:   Miles Dan and [facility] End[facility] --   Address: [address] [place], [place] [address]brionna [facility]  [facility], [address] [address]   Phone: [phone]       Non-[facility] Endocrinology groups: [facility]   Two locations: [address]    Phone: [phone]   [email or web address] [employer]     [address]   [place], [address] [facility]     AND    [address]    [place], [address] [facility]  [facility] and End[facility]ology   Two locations: [address]   Phone: [phone]   [email or web address]     [address] [place] [address], [address]     AND     [address] [facility]   Shelley Lassiter

## 2019-12-23 NOTE — PATIENT INSTRUCTIONS
Diabetes. Work on diet, exercise and weight loss efforts Medications: 
Continue Victoza 1.8 mg daily Continue metformin Continue pioglitazone Increase Levemir to 80 units. Reassess. Lower dose for values < 100 or 90. Monitor more before dinner and at bedtime. Continue monitoring fasting. Goals for blood glucose: 
Fasting  (less than 150) Lunch, Dinner, Bedtime -  (less than 180). Blood pressure: 
Continue losartan Cholesterol - continue atorvastatin. 
 
 
  
Endocrinology options in [place] area: [facility]:  
[address] [facility] Kanika FARFAN -- Address: [facility], [address], [address] [facility] [address] Phone: [phone] Non-[facility] Endocrinology groups:  
 
[facility] Two locations: [facility]  Phone: [phone]  
[email or web address]  
 
[address] [place], [address] [facility] AND 
 
[address] [place] [facility] Two locations: [address]  Phone: [phone]  
[email or web address]  
 
[address] [place], [facility]  [facility] Ajay Lassiter

## 2020-03-16 ENCOUNTER — DOCUMENTATION ONLY (OUTPATIENT)
Dept: ENDOCRINOLOGY | Age: 60
End: 2020-03-16

## 2020-03-16 NOTE — PROGRESS NOTES
BING: Patient is applying for PAP through [employer]. Patient states takes 80 units daily of Levemir in PM. She states that sometimes she can take 70 units, depending upon her blood sugars. She states that she takes 1.8 mg Victoza daily.

## 2020-03-24 ENCOUNTER — DOCUMENTATION ONLY (OUTPATIENT)
Dept: ENDOCRINOLOGY | Age: 60
End: 2020-03-24

## 2020-03-24 DIAGNOSIS — I10 ESSENTIAL HYPERTENSION: ICD-10-CM

## 2020-03-24 DIAGNOSIS — Z79.4 TYPE 2 DIABETES MELLITUS WITHOUT COMPLICATION, WITH LONG-TERM CURRENT USE OF INSULIN (HCC): Primary | ICD-10-CM

## 2020-03-24 DIAGNOSIS — E11.9 TYPE 2 DIABETES MELLITUS WITHOUT COMPLICATION, WITH LONG-TERM CURRENT USE OF INSULIN (HCC): Primary | ICD-10-CM

## 2020-03-24 DIAGNOSIS — E78.2 MIXED HYPERLIPIDEMIA: ICD-10-CM

## 2020-03-24 NOTE — PROGRESS NOTES
[facility] PAP  Faxed notification that patient has private prexcription coverage and is not eligible for the PAP program. The letter was addressed to patient.

## 2020-03-25 LAB
ALBUMIN SERPL-MCNC: 4 G/DL (ref 3.8–4.9)
ALBUMIN/CREAT UR: 10 MG/G CREAT (ref 0–29)
ALBUMIN/GLOB SERPL: 1.5 {RATIO} (ref 1.2–2.2)
ALP SERPL-CCNC: 80 IU/L (ref 39–117)
ALT SERPL-CCNC: 34 IU/L (ref 0–32)
AST SERPL-CCNC: 47 IU/L (ref 0–40)
BILIRUB SERPL-MCNC: <0.2 MG/DL (ref 0–1.2)
BUN SERPL-MCNC: 18 MG/DL (ref 6–24)
BUN/CREAT SERPL: 19 (ref 9–23)
CALCIUM SERPL-MCNC: 9.1 MG/DL (ref 8.7–10.2)
CHLORIDE SERPL-SCNC: 99 MMOL/L (ref 96–106)
CHOLEST SERPL-MCNC: 133 MG/DL (ref 100–199)
CO2 SERPL-SCNC: 21 MMOL/L (ref 20–29)
CREAT SERPL-MCNC: 0.96 MG/DL (ref 0.57–1)
CREAT UR-MCNC: 38.8 MG/DL
EST. AVERAGE GLUCOSE BLD GHB EST-MCNC: 217 MG/DL
GLOBULIN SER CALC-MCNC: 2.6 G/DL (ref 1.5–4.5)
GLUCOSE SERPL-MCNC: 204 MG/DL (ref 65–99)
HBA1C MFR BLD: 9.2 % (ref 4.8–5.6)
HDLC SERPL-MCNC: 35 MG/DL
INTERPRETATION, 910389: NORMAL
LDLC SERPL CALC-MCNC: 57 MG/DL (ref 0–99)
Lab: NORMAL
MICROALBUMIN UR-MCNC: 3.7 UG/ML
POTASSIUM SERPL-SCNC: 4.5 MMOL/L (ref 3.5–5.2)
PROT SERPL-MCNC: 6.6 G/DL (ref 6–8.5)
SODIUM SERPL-SCNC: 138 MMOL/L (ref 134–144)
TRIGL SERPL-MCNC: 204 MG/DL (ref 0–149)
VLDLC SERPL CALC-MCNC: 41 MG/DL (ref 5–40)

## 2020-03-26 ENCOUNTER — VIRTUAL VISIT (OUTPATIENT)
Dept: ENDOCRINOLOGY | Age: 60
End: 2020-03-26

## 2020-03-26 DIAGNOSIS — I10 ESSENTIAL HYPERTENSION: ICD-10-CM

## 2020-03-26 DIAGNOSIS — E78.2 MIXED HYPERLIPIDEMIA: ICD-10-CM

## 2020-03-26 DIAGNOSIS — Z79.4 TYPE 2 DIABETES MELLITUS WITHOUT COMPLICATION, WITH LONG-TERM CURRENT USE OF INSULIN (HCC): Primary | ICD-10-CM

## 2020-03-26 DIAGNOSIS — E11.9 TYPE 2 DIABETES MELLITUS WITHOUT COMPLICATION, WITH LONG-TERM CURRENT USE OF INSULIN (HCC): Primary | ICD-10-CM

## 2020-03-26 RX ORDER — ESCITALOPRAM OXALATE 10 MG/1
TABLET ORAL
COMMUNITY
Start: 2020-03-07 | End: 2021-09-03 | Stop reason: ALTCHOICE

## 2020-03-26 RX ORDER — INSULIN ASPART 100 [IU]/ML
INJECTION, SUSPENSION SUBCUTANEOUS
Qty: 30 ML | Refills: 3 | Status: SHIPPED | OUTPATIENT
Start: 2020-03-26 | End: 2020-04-24

## 2020-03-26 NOTE — PATIENT INSTRUCTIONS
1) Novolog 70/30: You will take 40 units in the morning and 40 units with dinner. 2) Send me blood sugar readings in 4 weeks.

## 2020-03-26 NOTE — PROGRESS NOTES
Chief Complaint   Patient presents with    Diabetes     pcp and pharmacy verified. Eye exam over a year. Shaista Veras Will be available 1:15 pm     History of Present Illness: Moni Vaughan is a 62 y.o. female here for follow up for diabetes. She was previously followed by Dr. Juan Pablo Chun. Her most recent A1C on 3/24/20 was 9.2%. Pt is taking Metformin 1000mg BID, Levemir 70 units HS, Victoza 1.8mg daily and Pioglitazone 15mg daily. She is checking her BGs daily. She note her FBGs run in the 110-210's  She has not had any episodes of hypoglycemia. Pt notes she has been \"furloughed\" but she is with out a job and she has been under a lot of stress and her diet has been \"poor\". A typical day is as follows:  - breakfast: She has breakfast around 730AM, she will have eggs, toast, nelson.  - lunch: She has lunch around 1PM, yesterday she had two hot dogs and water. - dinner: She has dinner around 630-7PM, last night she had tuna with rice, peas and water. Exercise consists of walking. No history of vascular disease. No history of neuropathy, or nephropathy. Last eye exam was 2018 \"I know I need to get in to see him\". Past Medical History:   Diagnosis Date    Diabetes ([address]     Herpes genitalis     High cholesterol     Hypercholesteremia     Hypertension     Ill-defined condition     hyperlipidemia     Past Surgical History:   Procedure Laterality Date    HX GYN      D&C and 2 c-sections     Current Outpatient Medications   Medication Sig    escitalopram oxalate (LEXAPRO) 10 mg tablet TAKE 1 TABLET BY MOUTH EVERY DAY    pioglitazone (ACTOS) 15 mg tablet TAKE 1 TABLET BY MOUTH EVERY DAY    metFORMIN (GLUCOPHAGE) 500 mg tablet TAKE TWO TABLETS BY MOUTH TWICE A DAY MEALS MEALS    atorvastatin (LIPITOR) 20 mg tablet Take 1 Tab by mouth daily.     TRUEPLUS INSULIN 1 mL 31 gauge x 5/16 syrg USE TO INJECT INSULIN EVERY EVENING    insulin syringe-needle U-100 1 mL 31 gauge x 15/64\" syrg 1 Each by SubCUTAneous route nightly.  Liraglutide (VICTOZA 3-KIMBERLY) 0.6 mg/0.1 mL (18 mg/3 mL) pnij 1.8 mg by SubCUTAneous route daily.  insulin detemir (LEVEMIR FLEXTOUCH) 100 unit/mL (3 mL) inpn 70 Units by SubCUTAneous route nightly. (Patient taking differently: 70 Units by SubCUTAneous route nightly. Indications: type 2 diabetes mellitus)    losartan (COZAAR) 100 mg tablet TAKE ONE TABLET BY MOUTH DAILY    Insulin Needles, Disposable, 31 gauge x 1/4\" ndle Take 1-3 times daily    NOVOFINE 30 30 gauge x 1/3\" USE TO INJECT VICTOZA ONCE DAILY    Insulin Needles, Disposable, 30 gauge x 1/3\" 0.3 ml subcutaneous daily    omeprazole (PRILOSEC) 40 mg capsule Take 1 Cap by mouth daily. No current facility-administered medications for this visit.       No Known Allergies  Family History   Problem Relation Age of Onset    Heart Disease Mother         had CHF    Cancer Other         colon ca    Diabetes Brother     Breast Cancer Sister 61     Social History     Socioeconomic History    Marital status:      Spouse name: Not on file    Number of children: Not on file    Years of education: Not on file    Highest education level: Not on file   Occupational History    Not on file   Social Needs    Financial resource strain: Not on file    Food insecurity     Worry: Not on file     Inability: Not on file    Transportation needs     Medical: Not on file     Non-medical: Not on file   Tobacco Use    Smoking status: Former Smoker     Last attempt to quit:      Years since quittin.2    Smokeless tobacco: Never Used   Substance and Sexual Activity    Alcohol use: No    Drug use: No    Sexual activity: Not Currently   Lifestyle    Physical activity     Days per week: Not on file     Minutes per session: Not on file    Stress: Not on file   Relationships    Social connections     Talks on phone: Not on file     Gets together: Not on file     Attends Alevism service: Not on file     Active member of club or organization: Not on file     Attends meetings of clubs or organizations: Not on file     Relationship status: Not on file    Intimate partner violence     Fear of current or ex partner: Not on file     Emotionally abused: Not on file     Physically abused: Not on file     Forced sexual activity: Not on file   Other Topics Concern    Not on file   Social History Narrative    Not on file     Review of Systems:  - Constitutional Symptoms: no fevers, chills, weight loss  - Eyes: no blurry vision or double vision  - Cardiovascular: no chest pain or palpitations  - Respiratory: no cough or shortness of breath  - Gastrointestinal: no dysphagia or abdominal pain  - Musculoskeletal: chronic knee pain  - ntegumentary: no rashes  - Neurological: no numbness, tingling, or headaches  - Psychiatric: no depression or anxiety  - Endocrine: no heat or cold intolerance, no polyuria or polydipsia  Kezia Williamson is a 62 y.o. female evaluated via telephone on 3/26/2020. Kezia Williamson is a 62 y.o. female who was seen by synchronous (real-time) audio-video technology on 3/26/2020. Consent:  She and/or her healthcare decision maker is aware that this patient-initiated Telehealth encounter is a billable service, with coverage as determined by her insurance carrier. She is aware that she may receive a bill and has provided verbal consent to proceed: Yes    I was in the office while conducting this encounter. Assessment & Plan:   Diagnoses and all orders for this visit:    1. Type 2 diabetes mellitus without complication, with long-term current use of insulin ([facility] [address]    2. Essential hypertension    3. Mixed hyperlipidemia    Other orders  -     insulin aspart protamine/insulin aspart (NOVOLOG MIX 70/30) 100 unit/mL (70-30) inpn; 40 units in the morning and 40 units with dinner          Follow-up and Dispositions    · Return in about 4 months (around 7/26/2020).            I spent at least 25 minutes with this established patient, and >50% of the time was spent counseling and/or coordinating care regarding   [id number]  Subjective:   Azael Maldonadochester was seen for Diabetes (pcp and pharmacy verified. Eye exam over a year. Keerthi Pacheco Will be available 1:15 pm)      Prior to Admission medications    Medication Sig Start Date End Date Taking? Authorizing Provider   escitalopram oxalate (LEXAPRO) 10 mg tablet TAKE 1 TABLET BY MOUTH EVERY DAY 3/7/20  Yes Provider, Historical   insulin aspart protamine/insulin aspart (NOVOLOG MIX 70/30) 100 unit/mL (70-30) inpn 40 units in the morning and 40 units with dinner 3/26/20  Yes Unique Cavazos MD   pioglitazone (ACTOS) 15 mg tablet TAKE 1 TABLET BY MOUTH EVERY DAY 11/12/19  Yes Gavin Montana MD   metFORMIN (GLUCOPHAGE) 500 mg tablet TAKE TWO TABLETS BY MOUTH TWICE A DAY MEALS MEALS 9/30/19  Yes Gavin Montana MD   atorvastatin (LIPITOR) 20 mg tablet Take 1 Tab by mouth daily. 9/19/19  Yes Gavin Montana MD   TRUEPLUS INSULIN 1 mL 31 gauge x 5/16 syrg USE TO INJECT INSULIN EVERY EVENING 7/8/19  Yes Gavin Montana MD   insulin syringe-needle U-100 1 mL 31 gauge x 15/64\" syrg 1 Each by SubCUTAneous route nightly. 1/24/18  Yes Gavin Montana MD   Liraglutide (VICTOZA 3-KIMEBRLY) 0.6 mg/0.1 mL (18 mg/3 mL) pnij 1.8 mg by SubCUTAneous route daily. 12/18/17  Yes Gavin Montana MD   insulin detemir (LEVEMIR FLEXTOUCH) 100 unit/mL (3 mL) inpn 70 Units by SubCUTAneous route nightly. Patient taking differently: 70 Units by SubCUTAneous route nightly.  Indications: type 2 diabetes mellitus 12/18/17  Yes Gavin Montana MD   losartan (COZAAR) 100 mg tablet TAKE ONE TABLET BY MOUTH DAILY 7/3/17  Yes Meche Cavazos MD   Insulin Needles, Disposable, 31 gauge x 1/4\" ndle Take 1-3 times daily 3/21/17  Yes Masha Gallegos MD   NOVOFINE 30 30 gauge x 1/3\" USE TO INJECT VICTOZA ONCE DAILY 3/20/17  Yes Masha Gallegos MD   Insulin Needles, Disposable, 30 gauge x 1/3\" 0.3 ml subcutaneous daily 3/20/17  Yes Wellington Phillips MD   omeprazole (PRILOSEC) 40 mg capsule Take 1 Cap by mouth daily. 6/10/16  Yes Wellington Phillips MD     No Known Allergies             PHYSICAL EXAMINATION:  [ INSTRUCTIONS:  \"[x]\" Indicates a positive item  \"[]\" Indicates a negative item  -- DELETE ALL ITEMS NOT EXAMINED]  Vital Signs: (As obtained by patient/caregiver at home)  There were no vitals taken for this visit. Constitutional: [x] Appears well-developed and well-nourished [x] No apparent distress      [] Abnormal -     Mental status: [x] Alert and awake  [x] Oriented to person/place/time [x] Able to follow commands    [] Abnormal -     Eyes:   EOM    [x]  Normal    [] Abnormal -   Sclera  [x]  Normal    [] Abnormal -          Discharge [x]  None visible   [] Abnormal -     HENT: [x] Normocephalic, atraumatic  [] Abnormal -   [x] Mouth/Throat: Mucous membranes are moist    External Ears [x] Normal  [] Abnormal -    Neck: [x] No visualized mass [] Abnormal -     Pulmonary/Chest: [x] Respiratory effort normal   [x] No visualized signs of difficulty breathing or respiratory distress        [] Abnormal -      Musculoskeletal:   [x] Normal gait with no signs of ataxia         [x] Normal range of motion of neck        [] Abnormal -     Neurological:        [x] No Facial Asymmetry (Cranial nerve 7 motor function) (limited exam due to video visit)          [x] No gaze palsy        [] Abnormal -          Skin:        [x] No significant exanthematous lesions or discoloration noted on facial skin         [] Abnormal -            Psychiatric:       [x] Normal Affect [] Abnormal -        [x] No Hallucinations    Other pertinent observable physical exam findings:-        We discussed the expected course, resolution and complications of the diagnosis(es) in detail. Medication risks, benefits, costs, interactions, and alternatives were discussed as indicated.   I advised her to contact the office if her condition worsens, changes or fails to improve as anticipated. She expressed understanding with the diagnosis(es) and plan. Pursuant to the emergency declaration under the [facility], [facility] waiver authority and the [employer] Act, this Virtual  Visit was conducted, with patient's consent, to reduce the patient's risk of exposure to COVID-19 and provide continuity of care for an established patient. Services were provided through a video synchronous discussion virtually to substitute for in-person clinic visit. Salvador Coburn MD        Data Reviewed:   Component      Latest Ref Rng & Units 3/24/2020 3/24/2020 3/24/2020 3/24/2020           4:22 PM  4:22 PM  4:22 PM  4:22 PM   Glucose      65 - 99 mg/dL 204 (H)      BUN      6 - 24 mg/dL 18      Creatinine      0.57 - 1.00 mg/dL 0.96      GFR est non-AA      >59 mL/min/1.73 65      GFR est AA      >59 mL/min/1.73 75      BUN/Creatinine ratio      9 - 23 19      Sodium      134 - 144 mmol/L 138      Potassium      3.5 - 5.2 mmol/L 4.5      Chloride      96 - 106 mmol/L 99      CO2      20 - 29 mmol/L 21      Calcium      8.7 - 10.2 mg/dL 9.1      Protein, total      6.0 - 8.5 g/dL 6.6      Albumin      3.8 - 4.9 g/dL 4.0      GLOBULIN, TOTAL      1.5 - 4.5 g/dL 2.6      A-G Ratio      1.2 - 2.2 1.5      Bilirubin, total      0.0 - 1.2 mg/dL <0.2      Alk. phosphatase      39 - 117 IU/L 80      AST      0 - 40 IU/L 47 (H)      ALT (SGPT)      0 - 32 IU/L 34 (H)      Cholesterol, total      100 - 199 mg/dL  133     Triglyceride      0 - 149 mg/dL  204 (H)     HDL Cholesterol      >39 mg/dL  35 (L)     VLDL, calculated      5 - 40 mg/dL  41 (H)     LDL, calculated      0 - 99 mg/dL  57     Creatinine, urine      Not Estab. mg/dL   38.8    Microalbumin, urine      Not Estab. ug/mL   3.7    Microalbumin/Creat.  Ratio      0 - 29 mg/g creat   10    Hemoglobin A1c, (calculated)      4.8 - 5.6 %    9.2 (H)   Estimated average glucose      mg/dL    217       Assessment/Plan:   1. Type 2 diabetes mellitus without complication, with long-term current use of insulin ([address]    2. Essential hypertension    3. Mixed hyperlipidemia    1) Her A1C yesterday was 9.2%. We discussed the need to decrease her carb intake and increase her physical activity. Pt notes the Levemir and Victoza are a financial strain on her now. We discussed trying Novolog 70/30, 40 units in the AM and 40 units with dinner to help improve her BGs. I sent the prescription to her pharmacy and pt will price this out. Pt to check her BGs 3 time per day and send me BG logs in a month. 2) Pt is taking Losartan for renal protection. 3) Her Lipid panel in March 2020 was at goal on Lipitor 20mg daily, which she is tolerating well. Pt voices understanding and agreement with the plan. Pain noted and pt was recommended to call her PCP for further evaluation and treatment, as needed    RTC 4 months      There are no Patient Instructions on file for this visit. Follow-up and Dispositions    · Return in about 4 months (around 7/26/2020).          Copy sent to:  Dr. Daniele Wilkins

## 2020-04-24 RX ORDER — CALCIUM CARB/VITAMIN D3/VIT K1 500-100-40
TABLET,CHEWABLE ORAL
Qty: 100 SYRINGE | Refills: 11 | Status: SHIPPED | OUTPATIENT
Start: 2020-04-24 | End: 2021-05-06

## 2020-07-02 ENCOUNTER — HOSPITAL ENCOUNTER (OUTPATIENT)
Dept: MAMMOGRAPHY | Age: 60
Discharge: HOME | End: 2020-07-02
Attending: FAMILY MEDICINE
Payer: COMMERCIAL

## 2020-07-02 DIAGNOSIS — Z12.31 VISIT FOR SCREENING MAMMOGRAM: ICD-10-CM

## 2020-07-02 PROCEDURE — 77067 SCR MAMMO BI INCL CAD: CPT

## 2020-09-03 ENCOUNTER — OFFICE VISIT (OUTPATIENT)
Dept: OBGYN CLINIC | Age: 60
End: 2020-09-03
Payer: COMMERCIAL

## 2020-09-03 VITALS
HEIGHT: 64 IN | SYSTOLIC BLOOD PRESSURE: 138 MMHG | WEIGHT: 204 LBS | DIASTOLIC BLOOD PRESSURE: 84 MMHG | BODY MASS INDEX: 34.83 KG/M2

## 2020-09-03 DIAGNOSIS — Z01.419 ENCOUNTER FOR GYNECOLOGICAL EXAMINATION WITHOUT ABNORMAL FINDING: Primary | ICD-10-CM

## 2020-09-03 DIAGNOSIS — E66.01 SEVERE OBESITY (HCC): ICD-10-CM

## 2020-09-03 DIAGNOSIS — N84.1 CERVICAL POLYP: ICD-10-CM

## 2020-09-03 PROCEDURE — 57500 BIOPSY OF CERVIX: CPT | Performed by: OBSTETRICS & GYNECOLOGY

## 2020-09-03 PROCEDURE — 99396 PREV VISIT EST AGE 40-64: CPT | Performed by: OBSTETRICS & GYNECOLOGY

## 2020-09-03 NOTE — PROGRESS NOTES
Annual exam ages postmenopausal    Lynn Hamilton is a ,  62 y.o. female [facility] No LMP recorded. (Menstrual status: Menopause). .    She presents for her annual checkup. 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 Medical conditions:    Since her last annual GYN exam about one year ago, she has not the following changes in her health history: none. Pap and Mammogram History:    Her most recent Pap smear was normal, obtained 1 year(s) ago. The patient had a recent mammogram 2month ago which was negative for malignancy. Breast Cancer History/Substance Abuse: negative    Osteoporosis History:    Family history does not include a first or second degree relative with osteopenia or osteoporosis. Past Medical History:   Diagnosis Date    Diabetes ([address]     Herpes genitalis     High cholesterol     Hypercholesteremia     Hypertension     Ill-defined condition     hyperlipidemia     Past Surgical History:   Procedure Laterality Date    HX GYN      D&C and 2 c-sections       Current Outpatient Medications   Medication Sig Dispense Refill    insulin NPH/insulin regular (NOVOLIN 70/30, HUMULIN 70/30) 100 unit/mL (70-30) injection 40 units in the morning and 40 units with dinner 30 mL 3    Insulin Syringe-Needle U-100 (TRUEplus Insulin) 1 mL 31 gauge x 5/16 syrg Two shots daily 100 Syringe 11    escitalopram oxalate (LEXAPRO) 10 mg tablet TAKE 1 TABLET BY MOUTH EVERY DAY      pioglitazone (ACTOS) 15 mg tablet TAKE 1 TABLET BY MOUTH EVERY DAY 30 Tab 11    metFORMIN (GLUCOPHAGE) 500 mg tablet TAKE TWO TABLETS BY MOUTH TWICE A DAY MEALS MEALS 120 Tab 11    atorvastatin (LIPITOR) 20 mg tablet Take 1 Tab by mouth daily.  90 Tab 4    Liraglutide (VICTOZA 3-KIMBERLY) 0.6 mg/0.1 mL (18 mg/3 mL) pnij 1.8 mg by SubCUTAneous route daily. 9 Pen 3    losartan (COZAAR) 100 mg tablet TAKE ONE TABLET BY MOUTH DAILY 30 Tab 3    NOVOFINE 30 30 gauge x 1/3\" USE TO INJECT VICTOZA ONCE DAILY 100 Pen Needle 99    omeprazole (PRILOSEC) 40 mg capsule Take 1 Cap by mouth daily. 30 Cap 12    Insulin Needles, Disposable, 31 gauge x 1/4\" ndle Take 1-3 times daily 100 Pen Needle 99    Insulin Needles, Disposable, 30 gauge x 1/3\" 0.3 ml subcutaneous daily 1 Package 11     Allergies: Patient has no known allergies. Tobacco History:  reports that she quit smoking about 9 years ago. She has never used smokeless tobacco.  Alcohol Abuse:  reports no history of alcohol use. Drug Abuse:  reports no history of drug use.     Family Medical/Cancer History:   Family History   Problem Relation Age of Onset    Heart Disease Mother         had CHF    Cancer Other         colon ca    Diabetes Brother     Breast Cancer Sister 61        Review of Systems - History obtained from the patient  Constitutional: negative for weight loss, fever, night sweats  HEENT: negative for hearing loss, earache, congestion, snoring, sorethroat  CV: negative for chest pain, palpitations, edema  Resp: negative for cough, shortness of breath, wheezing  GI: negative for change in bowel habits, abdominal pain, black or bloody stools  : negative for frequency, dysuria, hematuria, vaginal discharge  MSK: negative for back pain, joint pain, muscle pain  Breast: negative for breast lumps, nipple discharge, galactorrhea  Skin :negative for itching, rash, hives  Neuro: negative for dizziness, headache, confusion, weakness  Psych: negative for anxiety, depression, change in mood  Heme/lymph: negative for bleeding, bruising, pallor    Physical Exam    Visit Vitals  /84   Ht 5' 4\" (1.626 m)   Wt 204 lb (92.5 kg)   BMI 35.02 kg/m²       Constitutional  · Appearance: well-nourished, well developed, alert, in no acute distress    HENT  · Head and Face: appears normal    Chest  · Respiratory Effort: breathing unlabored      Breasts  · Inspection of Breasts: breasts symmetrical, no skin changes, no discharge present, nipple appearance normal, no skin retraction present  · Palpation of Breasts and Axillae: no masses present on palpation, no breast tenderness  · Axillary Lymph Nodes: no lymphadenopathy present    Gastrointestinal  · Abdominal Examination: abdomen non-tender to palpation, normal bowel sounds, no masses present  · Liver and spleen: no hepatomegaly present, spleen not palpable  · Hernias: no hernias identified    Skin  · General Inspection: no rash, no lesions identified    Neurologic/Psychiatric  · Mental Status:  · Orientation: grossly oriented to person, place and time  · Mood and Affect: mood normal, affect appropriate    Genitourinary  · External Genitalia: normal appearance for age, no discharge present, no tenderness present, no inflammatory lesions present, no masses present, no atrophy present  · Vagina: normal vaginal vault without central or paravaginal defects, no discharge present, no inflammatory lesions present, no masses present  · Bladder: non-tender to palpation  · Urethra: appears normal  · Cervix: normal 5mm polyp endocervical  · Uterus: normal size, shape and consistency  · Adnexa: no adnexal tenderness present, no adnexal masses present  · Perineum: perineum within normal limits, no evidence of trauma, no rashes or skin lesions present  · Anus: anus within normal limits, no hemorrhoids present  · Inguinal Lymph Nodes: no lymphadenopathy present  Procedure note: Cervical polyp removal    Indications:  Sarah Soria is a 62 y.o. female [facility] that was found to have a cervical polypoid lesion. After being presented with the risks, benefits and specific details of the procedure, she had no further questions. Procedure:  The patient was placed on the table in a dorsal lithotomy position and draped in the appropriate manner. The cervix was prepped with betadine . Once cleansed, the cervical polyp was grasped and removed with traction and rotation. The specimen was placed in formalin and sent to pathology for evaluation. Pressure was applied to the biopsy site to control bleeding. Hemostasis was adequate with direct pressure and silver nitrate. The patient tolerated the procedure well. There were no complications. She was observed for 1 minute and was discharged in good condition. Assessment:  Routine gynecologic examination; postmenopausal  Cervical polyp  Her current medical status is satisfactory with no evidence of significant gynecologic issues. Plan:  Pap done today  Will notify of results; postop instructions; update if any bleeding beyond 2-3 days  Patient offered and completed [facility] genetic screening questionnaire and no changes in personal and family pertinent medical history. Patient declines presence of chaperone during today's visit.    Counseled re: diet, exercise, healthy lifestyle  Return for yearly wellness visits  Rec annual mammogram

## 2020-09-03 NOTE — PATIENT INSTRUCTIONS
Pelvic Exam: Care Instructions Your Care Instructions When your doctor examines all of your pelvic organs, it's called a pelvic exam. Two good reasons to have this kind of exam are to check for sexually transmitted infections (STIs) and to get a Pap test. A Pap test is also called a Pap smear. It checks for early changes that can lead to cancer of the cervix. Sometimes a pelvic exam is part of a regular checkup. Your doctor may ask you to avoid vaginal sex, tampons, vaginal medicines, vaginal sprays or powders, and douching for 1 to 2 days before the test. 
Other times, women have this kind of exam at any time of the month. This is because they have pelvic pain, bleeding, or discharge. Or they may have another pelvic problem. Before your exam, it's important to share some information with your doctor. For example, if you are a survivor of rape or sexual abuse, you can talk about any concerns you may have. Your doctor will also want to know if you are pregnant or use birth control. And he or she will want to hear about any problems, surgeries, or procedures you have had in your pelvic area. You will also need to tell your doctor when your last period was. Follow-up care is a key part of your treatment and safety. Be sure to make and go to all appointments, and call your doctor if you are having problems. It's also a good idea to know your test results and keep a list of the medicines you take. How is a pelvic exam done? · During a pelvic exam, you will: ? Take off your clothes below the waist. You will get a paper or cloth cover to put over the lower half of your body. If this is regular checkup, you may undress completely and put on a gown. ? Lie on your back on an exam table. Your feet will be raised above you. Stirrups will support your feet. · The doctor will: ? Ask you to relax your knees. Your knees need to lean out, toward the walls. ? Check the opening of your vagina for sores or swelling. ? Gently put a tool called a speculum into your vagina. It opens the vagina a little bit. You will feel some pressure. But if you are relaxed, it will not hurt. It lets your doctor see inside the vagina. ? Use a small brush, spatula, or swab to get a sample of cells, if you are having a Pap test or culture. The doctor then removes the speculum. ? Put on gloves and put one or two fingers of one hand into your vagina. The other hand goes on your lower belly. This lets your doctor feel your pelvic organs. You will probably feel some pressure. Try to stay relaxed. ? Put one gloved finger into your rectum and one into your vagina, if needed. This can also help check your pelvic organs. This exam takes about 10 minutes. At the end, you will get a washcloth or tissue to clean your vaginal area. You can then get dressed. Why is a pelvic exam done? A pelvic exam may be done: · As part of a woman's regular physical checkup. The exam may include a Pap test. 
· To check for vaginal infection. · To check for sexually transmitted infections, such as chlamydia or herpes. · To help find the cause of abnormal uterine bleeding. · To look for problems like uterine fibroids, ovarian cysts, or uterine prolapse. · To find the cause of pelvic or belly pain. · Before inserting an intrauterine device (IUD) for birth control. · To collect evidence if you've been sexually assaulted. What are the risks of a pelvic exam? 
There is a small chance that the doctor will find something on a pelvic exam that would not have caused a problem. This is called overdiagnosis. It could lead to tests or treatment you don't need. When should you call for help? Watch closely for changes in your health, and be sure to contact your doctor if you have any problems. Where can you learn more? Go to http://rudi-pretty.info/ Enter V763 in the search box to learn more about \"Pelvic Exam: Care Instructions. \" Current as of: November 8, 2019               Content Version: 12.5 © 9646-3242 [employer]. Care instructions adapted under license by [employer] (which disclaims liability or warranty for this information). If you have questions about a medical condition or this instruction, always ask your healthcare professional. [place] [address] any warranty or liability for your use of this information.

## 2020-09-11 ENCOUNTER — TELEPHONE (OUTPATIENT)
Dept: ENDOCRINOLOGY | Age: 60
End: 2020-09-11

## 2020-09-11 RX ORDER — METFORMIN HYDROCHLORIDE 1000 MG/1
1000 TABLET ORAL 2 TIMES DAILY WITH MEALS
Qty: 180 TAB | Refills: 3 | Status: SHIPPED | OUTPATIENT
Start: 2020-09-11 | End: 2020-09-11 | Stop reason: SDUPTHER

## 2020-09-11 RX ORDER — METFORMIN HYDROCHLORIDE 1000 MG/1
1000 TABLET ORAL 2 TIMES DAILY WITH MEALS
Qty: 180 TAB | Refills: 3 | Status: SHIPPED | OUTPATIENT
Start: 2020-09-11 | End: 2021-09-20 | Stop reason: SDUPTHER

## 2020-09-19 LAB
CPT CODES, 490044: NORMAL
CPT DISCLAIMER: NORMAL
CYTOLOGY SPEC DOC CYTO: NORMAL
DIAGNOSIS SYNOPSIS:: NORMAL
DX ICD CODE: NORMAL
DX ICD CODE: NORMAL
PATH REPORT.GROSS SPEC: NORMAL
PATHOLOGIST NAME: NORMAL
PDF IMAGE, 807507: NORMAL
SPECIMEN SOURCE: NORMAL

## 2020-09-23 LAB
CYTOLOGIST CVX/VAG CYTO: NORMAL
CYTOLOGY CVX/VAG DOC CYTO: NORMAL
CYTOLOGY CVX/VAG DOC THIN PREP: NORMAL
DX ICD CODE: NORMAL
LABCORP, 190119: NORMAL
Lab: NORMAL
OTHER STN SPEC: NORMAL
PATHOLOGIST CVX/VAG CYTO: NORMAL
STAT OF ADQ CVX/VAG CYTO-IMP: NORMAL

## 2020-09-24 ENCOUNTER — PATIENT MESSAGE (OUTPATIENT)
Dept: ENDOCRINOLOGY | Age: 60
End: 2020-09-24

## 2020-09-24 NOTE — TELEPHONE ENCOUNTER
----- Message from Morales Nielsen sent at 9/24/2020 12:31 PM EDT -----  Regarding: Non-Urgent Medical Question  Contact: [phone]  I have an appt with Dr. Marycarmen Malagon on October  on October 8 2020 at 2:30 (2 weeks from today). Am I coming to the office for this appt or will it be virtual? I will also need to have lab work completed prior to this appt and can have it done either Thursday or Friday next week. I can be reached at [phone]. Thank you.     Phani Costello

## 2020-09-24 NOTE — TELEPHONE ENCOUNTER
Advised patient that Sharron Jasmine will be out the week of her appointment. Advised will give a message to the  staff to reschedule her appointment. She will need to have her labs drawn a week prior to her appt. Patient expressed understanding.

## 2020-10-14 RX ORDER — ATORVASTATIN CALCIUM 20 MG/1
20 TABLET, FILM COATED ORAL DAILY
Qty: 90 TAB | Refills: 3 | Status: SHIPPED | OUTPATIENT
Start: 2020-10-14 | End: 2021-09-28

## 2020-10-30 ENCOUNTER — VIRTUAL VISIT (OUTPATIENT)
Dept: ENDOCRINOLOGY | Age: 60
End: 2020-10-30
Payer: COMMERCIAL

## 2020-10-30 DIAGNOSIS — Z79.4 TYPE 2 DIABETES MELLITUS WITHOUT COMPLICATION, WITH LONG-TERM CURRENT USE OF INSULIN (HCC): Primary | ICD-10-CM

## 2020-10-30 DIAGNOSIS — E11.9 TYPE 2 DIABETES MELLITUS WITHOUT COMPLICATION, WITH LONG-TERM CURRENT USE OF INSULIN (HCC): Primary | ICD-10-CM

## 2020-10-30 PROCEDURE — 99214 OFFICE O/P EST MOD 30 MIN: CPT | Performed by: INTERNAL MEDICINE

## 2020-10-30 RX ORDER — OMEPRAZOLE 20 MG/1
CAPSULE, DELAYED RELEASE ORAL
COMMUNITY
Start: 2020-09-04 | End: 2021-09-03 | Stop reason: ALTCHOICE

## 2020-10-30 RX ORDER — SILVER SULFADIAZINE 10 G/1000G
CREAM TOPICAL
COMMUNITY
Start: 2020-07-30 | End: 2021-09-03 | Stop reason: ALTCHOICE

## 2020-10-30 NOTE — PROGRESS NOTES
Chief Complaint   Patient presents with    Diabetes     Former pt of Dr Silvano Felton: [email or web address]    [facility]     [facility]       History of Present Illness: Mariya Sibley is a 62 y.o. female with a past medical hx significant for HTN, HLD, anemia, BMI35 presenting in follow up for continued discussion regarding diabetes mellitus. Last visit with Billie in 2020:  1) Her A1C yesterday was 9.2%. We discussed the need to decrease her carb intake and increase her physical activity. Pt notes the Levemir and Victoza are a financial strain on her now. We discussed trying Novolog 70/30, 40 units in the AM and 40 units with dinner to help improve her BGs. I sent the prescription to her pharmacy and pt will price this out. Pt to check her BGs 3 time per day and send me BG logs in a month. Today:  40U 70/30 is doing pretty well for pt. Admits to dietary indiscretion- pasta, carbs. Lives [place] away from [place] [address] at [place]. Brother got her a recumbent bike. Sister and oldest brother have hemochromatosis. Daughter is working in [place] with environmental services. Is motivated to have a better diet for when her daughter gets home from [facility]. Diabetes Mellitus Type II    * Diagnosed (year): Gestational diabetes 22 years ago   * Last Hb A1C: Results for Vida Price (MRN [id number]) as of 10/30/2020 15:49   Ref. Range 2019 14:55   Hemoglobin A1c, (calculated) Latest Ref Range: 4.8 - 5.6 % 7.9 (H)        - Current DM medications:    - Orals: metformin 1000mg BID, pioglitazone 15mg every day    - Injectables: 40U BID 70/30    - Monitors glucose: 1 times a day   Examples (range):    - [phone] rarely 200s (248 one day, 204 yesterday)    - History of hypoglycemia: rarely in the 80s    - Hospitalized for DM: none      Diabetes-related complications:    * Nephropathy: Results for Vida Price (MRN [id number]) as of 10/30/2020 15:49   Ref.  Range 9/12/2019 14:55   Microalbumin/Creat. Ratio Latest Ref Range: 0.0 - 30.0 mg/g creat 15.8      * Retinopathy: >1 year ago, needs to get a hold of him   * Neuropathy: none   * Autonomic dysfunction: none   * History of amputations or foot ulcers: none     Lifestyle:    24-hour diet history:    2 meals/day 1 snacks/day   * Breakfast:    * Lunch: Sub from [employer], 1 egg, 2 pieces of nelson and english muff    * Dinner: 2 pieces of toast tomato soup, grilled cheese   * Snacks:  grapes   * Desserts:    * Drinks: No soda- h20 or seltzer    2019QI  Exercise:  Recumbent bike     Support and Resources:   - Visit with dietician in the last 2 years: yes, many times    Current Outpatient Medications   Medication Sig    omeprazole (PRILOSEC) 20 mg capsule TAKE 1 CAPSULE BY MOUTH EVERY DAY    silver sulfADIAZINE (SILVADENE) 1 % topical cream APPLY TO AFFECTED AREA TWICE A DAY    atorvastatin (LIPITOR) 20 mg tablet Take 1 Tab by mouth daily.  insulin NPH/insulin regular (NovoLIN 70/30 U-100 Insulin) 100 unit/mL (70-30) injection INJECT 40 UNITS SUBCUTANEOUSLY IN THE MORNING AND 40 UNITS WITH DINNER    metFORMIN (GLUCOPHAGE) 1,000 mg tablet Take 1 Tab by mouth two (2) times daily (with meals).  Insulin Syringe-Needle U-100 (TRUEplus Insulin) 1 mL 31 gauge x 5/16 syrg Two shots daily    escitalopram oxalate (LEXAPRO) 10 mg tablet TAKE 1 TABLET BY MOUTH EVERY DAY    pioglitazone (ACTOS) 15 mg tablet TAKE 1 TABLET BY MOUTH EVERY DAY    losartan (COZAAR) 100 mg tablet TAKE ONE TABLET BY MOUTH DAILY    NOVOFINE 30 30 gauge x 1/3\" USE TO INJECT VICTOZA ONCE DAILY    omeprazole (PRILOSEC) 40 mg capsule Take 1 Cap by mouth daily. No current facility-administered medications for this visit.       No Known Allergies    Review of Systems:  - Eyes: no blurry vision or double vision  - Cardiovascular: no chest pain  - Respiratory: no shortness of breath  - Musculoskeletal: no myalgias  - Neurological: no numbness/tingling in extremities    Physical Examination:  - GENERAL: NCAT, Appears well nourished   - EYES: EOMI, non-icteric, no proptosis   - Ear/Nose/Throat: NCAT, no visible inflammation or masses   - CARDIOVASCULAR: no cyanosis, no visible JVD   - RESPIRATORY: respiratory effort normal without any distress or labored breathing   - MUSCULOSKELETAL: Normal ROM of neck and upper extremities observed   - SKIN: No rash on face  - NEUROLOGIC:  No facial asymmetry (Cranial nerve 7 motor function), No gaze palsy   - PSYCHIATRIC: Normal affect, Normal insight and judgement     Data Reviewed:   Results for Danie Kearney (MRN [id number]) as of 10/30/2020 15:49   Ref. Range 9/12/2019 14:55   ALT Latest Ref Range: 0 - 32 IU/L 25   AST Latest Ref Range: 0 - 40 IU/L 33       Assessment/Plan: This is a very pleasant 50-year-old female with past medical history significant for type 2 diabetes without known complication presenting in follow-up for continued discussion regarding medication management. Currently, patient is maximized on Metformin 1000 mg twice daily. She is also taking pioglitazone at 15 mg, and 70/30 insulin 40 units twice daily. Her fasting blood glucose levels are typically in the 100s but at times after dietary indiscretions are in the low 200s. She is motivated to make dietary changes. We will obtain a repeat hemoglobin A1c, assess her for hemochromatosis with ferritin given family history, obtain lipid panel and basic metabolic panel.     #DMII moderately well controlled without complication  -Obtain hemoglobin A1c  -Urinary microalbumin creatinine ratio was normal this year  -Is planning on seeing ophthalmologist soon  -Continue Metformin 1000 mg twice daily  -Continue 40 units of 70/30 insulin twice daily  -Continue pioglitazone 15 mg daily, could increase to 30 mg daily  -Monitor carbohydrate ingestion  -Patient is taking a statin and in ARB  -Obtain ferritin given family history of hemochromatosis    Copy sent to: Roger Guthrie MD    Return to care: November 20 at 100 Iliana Lester is a 62 y.o. female being evaluated by a Virtual Visit (video visit) encounter to address concerns as mentioned above. A caregiver was present when appropriate. Due to this being a TeleHealth encounter (During ZYIDB-87 public health emergency), evaluation of the following organ systems was limited:     Vitals/Constitutional/EENT/Resp/CV/GI//MS/Neuro/Skin/Heme-Lymph-Imm. Pursuant to the emergency declaration under the [address] [place] [address], [address] [place] [address] authority and the [employer] Act, this Virtual Visit was conducted with patient's (and/or legal guardian's) consent, to reduce the risk of exposure to COVID-19 and provide necessary medical care. Services were provided through a video synchronous discussion virtually to substitute for in-person encounter. Sonia Piedra MD  [facility]     An electronic signature was used to authenticate this note.

## 2020-10-31 LAB
BUN SERPL-MCNC: 18 MG/DL (ref 6–24)
BUN/CREAT SERPL: 21 (ref 9–23)
CALCIUM SERPL-MCNC: 9.1 MG/DL (ref 8.7–10.2)
CHLORIDE SERPL-SCNC: 102 MMOL/L (ref 96–106)
CHOLEST SERPL-MCNC: 140 MG/DL (ref 100–199)
CO2 SERPL-SCNC: 19 MMOL/L (ref 20–29)
CREAT SERPL-MCNC: 0.84 MG/DL (ref 0.57–1)
EST. AVERAGE GLUCOSE BLD GHB EST-MCNC: 266 MG/DL
FERRITIN SERPL-MCNC: 7 NG/ML (ref 15–150)
GLUCOSE SERPL-MCNC: 480 MG/DL (ref 65–99)
HBA1C MFR BLD: 10.9 % (ref 4.8–5.6)
HDLC SERPL-MCNC: 33 MG/DL
IRON SATN MFR SERPL: 3 % (ref 15–55)
IRON SERPL-MCNC: 12 UG/DL (ref 27–159)
LDLC SERPL CALC-MCNC: 54 MG/DL (ref 0–99)
POTASSIUM SERPL-SCNC: 5 MMOL/L (ref 3.5–5.2)
SODIUM SERPL-SCNC: 137 MMOL/L (ref 134–144)
TIBC SERPL-MCNC: 396 UG/DL (ref 250–450)
TRIGL SERPL-MCNC: 342 MG/DL (ref 0–149)
UIBC SERPL-MCNC: 384 UG/DL (ref 131–425)
VLDLC SERPL CALC-MCNC: 53 MG/DL (ref 5–40)

## 2020-11-02 ENCOUNTER — TELEPHONE (OUTPATIENT)
Dept: ENDOCRINOLOGY | Age: 60
End: 2020-11-02

## 2020-11-03 RX ORDER — PIOGLITAZONEHYDROCHLORIDE 15 MG/1
15 TABLET ORAL DAILY
Qty: 30 TAB | Refills: 3 | Status: SHIPPED | OUTPATIENT
Start: 2020-11-03 | End: 2021-02-21

## 2020-11-20 ENCOUNTER — VIRTUAL VISIT (OUTPATIENT)
Dept: ENDOCRINOLOGY | Age: 60
End: 2020-11-20
Payer: COMMERCIAL

## 2020-11-20 DIAGNOSIS — Z79.4 TYPE 2 DIABETES MELLITUS WITHOUT COMPLICATION, WITH LONG-TERM CURRENT USE OF INSULIN (HCC): Primary | ICD-10-CM

## 2020-11-20 DIAGNOSIS — E11.9 TYPE 2 DIABETES MELLITUS WITHOUT COMPLICATION, WITH LONG-TERM CURRENT USE OF INSULIN (HCC): Primary | ICD-10-CM

## 2020-11-20 PROCEDURE — 99214 OFFICE O/P EST MOD 30 MIN: CPT | Performed by: INTERNAL MEDICINE

## 2020-11-20 RX ORDER — LANOLIN ALCOHOL/MO/W.PET/CERES
CREAM (GRAM) TOPICAL
COMMUNITY
Start: 2020-11-04 | End: 2021-09-03 | Stop reason: ALTCHOICE

## 2020-11-20 RX ORDER — PEN NEEDLE, DIABETIC 31 GX3/16"
1 NEEDLE, DISPOSABLE MISCELLANEOUS DAILY
Qty: 100 PEN NEEDLE | Refills: 2 | Status: SHIPPED | OUTPATIENT
Start: 2020-11-20

## 2020-11-20 NOTE — PROGRESS NOTES
Chief Complaint   Patient presents with    Diabetes     doxy: [email or web address]    Follow Up Appointment       History of Present Illness: Miriam Chavez is a 62 y.o. female with a past medical hx significant for HTN, HLD, anemia, BMI35 presenting in follow up for continued discussion regarding diabetes mellitus. Last visit with Billie in 03/2020:  1) Her A1C yesterday was 9.2%. We discussed the need to decrease her carb intake and increase her physical activity. Pt notes the Levemir and Victoza are a financial strain on her now. We discussed trying Novolog 70/30, 40 units in the AM and 40 units with dinner to help improve her BGs. I sent the prescription to her pharmacy and pt will price this out. Pt to check her BGs 3 time per day and send me BG logs in a month. Previously:  40U 70/30 is doing pretty well for pt. Admits to dietary indiscretion- pasta, carbs. Lives [place] mins away from [place] [address] at [place]. Brother got her a recumbent bike. Sister and oldest brother have hemochromatosis. Daughter is working in [place] with environmental services. Is motivated to have a better diet for when her daughter gets home from [facility]. Today:  Bought a [employer] glucometer- BG was 343,301, high 200s, 436- late afternoon- also high in the morning. Is feeling better overall with iron supplementation. [employer] brand meter 192 this AM. Increased insulin to 50U and it is a little better. Did eat lo mein last night for dinner. Daughter surprised her on Wednesday. Had chinese doughnuts for breakfast.  Found out she is a carrier for hemochromatosis. Is taking vitamin C and ferrous sulfate twice daily. Has a history of polyps on colonoscopy. Weight is between 205 and 208. Previously was on victoza. Now that she has more energy, is planning on exercising more with the recumbent bike that her brother daughter. Deisy previously was taking Victoza, has an old supply.   She was told to discontinue this medication at some point. Diabetes Mellitus Type II    * Diagnosed (year): Gestational diabetes 22 years ago   * Last Hb A1C: Results for Bill Carnes (MRN [id number]) as of 11/20/2020 15:26   Ref. Range 10/30/2020 16:48   Hemoglobin A1c, (calculated) Latest Ref Range: 4.8 - 5.6 % 10.9 (H)        - Current DM medications:    - Orals: metformin 1000mg BID, pioglitazone 15mg every day    - Injectables: 50U BID 70/30    - Monitors glucose: Twice daily   Examples (range):    - fasting: see HPI     - History of hypoglycemia: rarely in the [de-identified]    - Hospitalized for DM: none      Diabetes-related complications:    * Nephropathy: Results for Bill Carnes (MRN [id number]) as of 10/30/2020 15:49   Ref. Range 9/12/2019 14:55   Microalbumin/Creat. Ratio Latest Ref Range: 0.0 - 30.0 mg/g creat 15.8      * Retinopathy: >1 year ago, needs to get a hold of him   * Neuropathy: none   * Autonomic dysfunction: none   * History of amputations or foot ulcers: none     Lifestyle:    24-hour diet history:    2 meals/day 1 snacks/day   * Breakfast: Egg nelson   * Lunch: Sub from [employer], 1 egg, 2 pieces of nelson and english muff    * Dinner: 2 pieces of toast tomato soup, grilled cheese   * Snacks:  grapes   * Desserts:    * Drinks: No soda- h20 or seltzer    2019QI  Exercise:  Recumbent bike     Support and Resources:   - Visit with dietician in the last 2 years: yes, many times    Current Outpatient Medications   Medication Sig    ferrous sulfate 325 mg (65 mg iron) tablet TAKE 1 TABLET BY MOUTH TWICE A DAY    pioglitazone (ACTOS) 15 mg tablet Take 1 Tab by mouth daily.  omeprazole (PRILOSEC) 20 mg capsule TAKE 1 CAPSULE BY MOUTH EVERY DAY    silver sulfADIAZINE (SILVADENE) 1 % topical cream APPLY TO AFFECTED AREA TWICE A DAY    atorvastatin (LIPITOR) 20 mg tablet Take 1 Tab by mouth daily.     insulin NPH/insulin regular (NovoLIN 70/30 U-100 Insulin) 100 unit/mL (70-30) injection INJECT 40 UNITS SUBCUTANEOUSLY IN THE MORNING AND 40 UNITS WITH DINNER    metFORMIN (GLUCOPHAGE) 1,000 mg tablet Take 1 Tab by mouth two (2) times daily (with meals).  Insulin Syringe-Needle U-100 (TRUEplus Insulin) 1 mL 31 gauge x 5/16 syrg Two shots daily    escitalopram oxalate (LEXAPRO) 10 mg tablet TAKE 1 TABLET BY MOUTH EVERY DAY    losartan (COZAAR) 100 mg tablet TAKE ONE TABLET BY MOUTH DAILY    NOVOFINE 30 30 gauge x 1/3\" USE TO INJECT VICTOZA ONCE DAILY    omeprazole (PRILOSEC) 40 mg capsule Take 1 Cap by mouth daily. No current facility-administered medications for this visit. Review of Systems:  - Eyes: no blurry vision or double vision  - Cardiovascular: no chest pain  - Respiratory: no shortness of breath  - Musculoskeletal: no myalgias  - Neurological: no numbness/tingling in extremities    Physical Examination:  - GENERAL: NCAT, Appears well nourished   - EYES: EOMI, non-icteric, no proptosis   - Ear/Nose/Throat: NCAT, no visible inflammation or masses   - CARDIOVASCULAR: no cyanosis, no visible JVD   - RESPIRATORY: respiratory effort normal without any distress or labored breathing   - MUSCULOSKELETAL: Normal ROM of neck and upper extremities observed   - SKIN: No rash on face  - NEUROLOGIC:  No facial asymmetry (Cranial nerve 7 motor function), No gaze palsy   - PSYCHIATRIC: Normal affect, Normal insight and judgement     Data Reviewed:   Results for Jessie Holcomb (MRN [id number]) as of 10/30/2020 15:49   Ref. Range 9/12/2019 14:55   ALT Latest Ref Range: 0 - 32 IU/L 25   AST Latest Ref Range: 0 - 40 IU/L 33     Results for Jessie Holcomb (MRN [id number]) as of 11/20/2020 15:26   Ref. Range 10/30/2020 16:48   Iron % saturation Latest Ref Range: 15 - 55 % 3 (LL)   UIBC Latest Ref Range: 131 - 425 ug/dL 384   Ferritin Latest Ref Range: 15 - 150 ng/mL 7 (L)     Results for Jessie Holcomb (MRN [id number]) as of 11/20/2020 15:26   Ref.  Range 10/30/2020 16:48   Triglyceride Latest Ref Range: 0 - 149 mg/dL 342 (H)   Cholesterol, total Latest Ref Range: 100 - 199 mg/dL 140   HDL Cholesterol Latest Ref Range: >39 mg/dL 33 (L)   LDL Cholesterol Latest Ref Range: 0 - 99 mg/dL 54     Assessment/Plan: This is a very pleasant 60-year-old female with past medical history significant for type 2 diabetes without known complication presenting in follow-up for continued discussion regarding medication management. Currently, patient is maximized on Metformin 1000 mg twice daily. She is also taking pioglitazone at 15 mg, and 70/30 insulin 50 units twice daily, which she increased from 40 units twice daily following our last visit. Repeat hemoglobin A1c showed a significant increase following the last assessment. Will retrial a GLP-1 agonist for both weight loss and blood glucose control. Deisy previously tolerated Victoza. She is motivated to make significant changes in her diet and exercise routine. Has a recumbent bike and now that she is being treated with iron, finds that she has increased energy to use the bike. Encouraged her to have a colonoscopy given the degree of iron deficiency. #DMII currently poorly controlled with hemoglobin A1c 10.9%  -Urinary microalbumin creatinine ratio was normal this year  -Is planning on seeing ophthalmologist soon  -Drangahrauni 3, patient has previously tolerated this  -Continue Metformin 1000 mg twice daily  -Continue 50 units of 70/30 insulin twice daily-can down titrate by 5 units every day for each dose if blood glucose is less than 90  -Continue pioglitazone 15 mg daily, could increase to 30 mg daily  -Monitor carbohydrate ingestion  -Patient is taking a statin and in ARB    Copy sent to: Erica Henderson MD    Return to care: Jan 7th at 2:10    Nini Berg is a 62 y.o. female being evaluated by a Virtual Visit (video visit) encounter to address concerns as mentioned above. A caregiver was present when appropriate.  Due to this being a TeleHealth encounter (During [facility]-17 public health emergency), evaluation of the following organ systems was limited:     Vitals/Constitutional/EENT/Resp/CV/GI//MS/Neuro/Skin/Heme-Lymph-Imm. Pursuant to the emergency declaration under the [address] [place] [address], [address] [place] [address] authority and the [employer] and [employer] Act, this Virtual Visit was conducted with patient's (and/or legal guardian's) consent, to reduce the risk of exposure to COVID-19 and provide necessary medical care. Services were provided through a video synchronous discussion virtually to substitute for in-person encounter. Shannan Flowers MD  [facility]     An electronic signature was used to authenticate this note.

## 2020-11-20 NOTE — PROGRESS NOTES
Lab Results   Component Value Date/Time    Hemoglobin A1c 10.9 (H) 10/30/2020 04:48 PM    Hemoglobin A1c 9.2 (H) 03/24/2020 04:22 PM    Hemoglobin A1c 7.9 (H) 09/12/2019 02:55 PM     Lab Results   Component Value Date/Time    Cholesterol, total 140 10/30/2020 04:48 PM    HDL Cholesterol 33 (L) 10/30/2020 04:48 PM    LDL, calculated 57 03/24/2020 04:22 PM    LDL Chol Calc (NIH) 54 10/30/2020 04:48 PM    VLDL, calculated 41 (H) 03/24/2020 04:22 PM    VLDL Cholesterol Howie 53 (H) 10/30/2020 04:48 PM    Triglyceride 342 (H) 10/30/2020 04:48 PM     Lab Results   Component Value Date/Time    Sodium 137 10/30/2020 04:48 PM    Potassium 5.0 10/30/2020 04:48 PM    Chloride 102 10/30/2020 04:48 PM    CO2 19 (L) 10/30/2020 04:48 PM    Anion gap 10 12/03/2016 05:45 PM    Glucose 480 (H) 10/30/2020 04:48 PM    BUN 18 10/30/2020 04:48 PM    Creatinine 0.84 10/30/2020 04:48 PM    BUN/Creatinine ratio 21 10/30/2020 04:48 PM    GFR est AA 88 10/30/2020 04:48 PM    GFR est non-AA 76 10/30/2020 04:48 PM    Calcium 9.1 10/30/2020 04:48 PM    Bilirubin, total <0.2 03/24/2020 04:22 PM    Alk.  phosphatase 80 03/24/2020 04:22 PM    Protein, total 6.6 03/24/2020 04:22 PM    Albumin 4.0 03/24/2020 04:22 PM    Globulin 4.4 (H) 12/03/2016 05:45 PM    A-G Ratio 1.5 03/24/2020 04:22 PM    ALT (SGPT) 34 (H) 03/24/2020 04:22 PM    AST (SGOT) 47 (H) 03/24/2020 04:22 PM

## 2021-01-07 ENCOUNTER — VIRTUAL VISIT (OUTPATIENT)
Dept: ENDOCRINOLOGY | Age: 61
End: 2021-01-07
Payer: MEDICAID

## 2021-01-07 DIAGNOSIS — Z79.4 TYPE 2 DIABETES MELLITUS WITHOUT COMPLICATION, WITH LONG-TERM CURRENT USE OF INSULIN (HCC): Primary | ICD-10-CM

## 2021-01-07 DIAGNOSIS — E11.9 TYPE 2 DIABETES MELLITUS WITHOUT COMPLICATION, WITH LONG-TERM CURRENT USE OF INSULIN (HCC): Primary | ICD-10-CM

## 2021-01-07 PROCEDURE — 99214 OFFICE O/P EST MOD 30 MIN: CPT | Performed by: INTERNAL MEDICINE

## 2021-01-07 NOTE — PROGRESS NOTES
Chief Complaint   Patient presents with    Diabetes     DOXY: [phone] pt will be ready by 2pm    Follow-up       History of Present Illness: Kerri Gonzalez is a 62 y.o. female with a past medical hx significant for HTN, HLD, anemia, BMI35 presenting in follow up for continued discussion regarding diabetes mellitus. Last visit with Billie in 03/2020:  1) Her A1C yesterday was 9.2%. We discussed the need to decrease her carb intake and increase her physical activity. Pt notes the Levemir and Victoza are a financial strain on her now. We discussed trying Novolog 70/30, 40 units in the AM and 40 units with dinner to help improve her BGs. I sent the prescription to her pharmacy and pt will price this out. Pt to check her BGs 3 time per day and send me BG logs in a month. Previously:  40U 70/30 is doing pretty well for pt. Admits to dietary indiscretion- pasta, carbs. Lives [place] mins away from [place] [address] at [place]. Brother got her a recumbent bike. Sister and oldest brother have hemochromatosis. Daughter is working in [place] with environmental services. Is motivated to have a better diet for when her daughter gets home from [facility]. 11/20/2020:  Bought a [employer] glucometer- BG was 343,301, high 200s, 436- late afternoon- also high in the morning. Is feeling better overall with iron supplementation. [employer] brand meter 192 this AM. Increased insulin to 50U and it is a little better. Did eat lo mein last night for dinner. Daughter surprised her on Wednesday. Had chinese doughnuts for breakfast.  Found out she is a carrier for hemochromatosis. Is taking vitamin C and ferrous sulfate twice daily. Has a history of polyps on colonoscopy. Weight is between 205 and 208. Previously was on victoza. Now that she has more energy, is planning on exercising more with the recumbent bike that her brother daughter. Deisy previously was taking Victoza, has an old supply.   She was told to discontinue this medication at some point. 2020: Daughter just went back to [facility]. Had a good [employer]. Has tried to stay away from the candy at work. Also wants to talk with a psychologist. Insurance just changed. Was not able to afford liraglutide. Diabetes Mellitus Type II    * Diagnosed (year): Gestational diabetes 22 years ago   * Last Hb A1C: Results for Robel Lagos (MRN [id number]) as of 2020 15:26   Ref. Range 10/30/2020 16:48   Hemoglobin A1c, (calculated) Latest Ref Range: 4.8 - 5.6 % 10.9 (H)        - Current DM medications:    - Orals: metformin 1000mg BID, pioglitazone 15mg every day    - Injectables: 50U BID     - Monitors glucose: Twice daily   Examples (range):    - fastin this AM,220,185,150,191,239,205,132 90 beginning of ,  before new year (had eaten mac n cheese)    - History of hypoglycemia: rarely in the [de-identified]    - Hospitalized for DM: none      Diabetes-related complications:    * Nephropathy: Results for Robel Lagos (MRN [id number]) as of 10/30/2020 15:49   Ref. Range 2019 14:55   Microalbumin/Creat.  Ratio Latest Ref Range: 0.0 - 30.0 mg/g creat 15.8      * Retinopathy: >1 year ago, needs to get a hold of him   * Neuropathy: none   * Autonomic dysfunction: none   * History of amputations or foot ulcers: none     Lifestyle:    24-hour diet history: previously   2 meals/day 1 snacks/day   * Breakfast: Egg nelson   * Lunch: Sub from [employer], 1 egg, 2 pieces of nelson and english muff    * Dinner: 2 pieces of toast tomato soup, grilled cheese   * Snacks:  grapes   * Desserts:    * Drinks: No soda- h20 or seltzer    2019QI  Exercise:  Recumbent bike     Support and Resources:   - Visit with dietician in the last 2 years: yes, many times    Current Outpatient Medications   Medication Sig    ferrous sulfate 325 mg (65 mg iron) tablet TAKE 1 TABLET BY MOUTH TWICE A DAY    Insulin Needles, Disposable, 32 gauge x 32\" ndle 1 Each by Does Not Apply route daily.  pioglitazone (ACTOS) 15 mg tablet Take 1 Tab by mouth daily.  omeprazole (PRILOSEC) 20 mg capsule TAKE 1 CAPSULE BY MOUTH EVERY DAY    silver sulfADIAZINE (SILVADENE) 1 % topical cream APPLY TO AFFECTED AREA TWICE A DAY    atorvastatin (LIPITOR) 20 mg tablet Take 1 Tab by mouth daily.  insulin NPH/insulin regular (NovoLIN 70/30 U-100 Insulin) 100 unit/mL (70-30) injection INJECT 40 UNITS SUBCUTANEOUSLY IN THE MORNING AND 40 UNITS WITH DINNER (Patient taking differently: 50 Units by SubCUTAneous route two (2) times a day. Indications: type 2 diabetes mellitus)    metFORMIN (GLUCOPHAGE) 1,000 mg tablet Take 1 Tab by mouth two (2) times daily (with meals).  Insulin Syringe-Needle U-100 (TRUEplus Insulin) 1 mL 31 gauge x 5/16 syrg Two shots daily    escitalopram oxalate (LEXAPRO) 10 mg tablet TAKE 1 TABLET BY MOUTH EVERY DAY    losartan (COZAAR) 100 mg tablet TAKE ONE TABLET BY MOUTH DAILY    NOVOFINE 30 30 gauge x 1/3\" USE TO INJECT VICTOZA ONCE DAILY    liraglutide (VICTOZA) 0.6 mg/0.1 mL (18 mg/3 mL) pnij 0.6 mg daily for 7 days, THEN 1.2 mg daily for 7 days, THEN 1.8 mg daily for 200 days.  omeprazole (PRILOSEC) 40 mg capsule Take 1 Cap by mouth daily. No current facility-administered medications for this visit.       Review of Systems:  - Eyes: no blurry vision or double vision  - Cardiovascular: no chest pain  - Respiratory: no shortness of breath  - Musculoskeletal: no myalgias  - Neurological: no numbness/tingling in extremities    Physical Examination:  - GENERAL: NCAT, Appears well nourished   - EYES: EOMI, non-icteric, no proptosis   - Ear/Nose/Throat: NCAT, no visible inflammation or masses   - CARDIOVASCULAR: no cyanosis, no visible JVD   - RESPIRATORY: respiratory effort normal without any distress or labored breathing   - MUSCULOSKELETAL: Normal ROM of neck and upper extremities observed   - SKIN: No rash on face  - NEUROLOGIC:  No facial asymmetry (Cranial nerve 7 motor function), No gaze palsy   - PSYCHIATRIC: Normal affect, Normal insight and judgement     Data Reviewed:   Results for Jayshree Wiseman (MRN [id number]) as of 10/30/2020 15:49   Ref. Range 9/12/2019 14:55   ALT Latest Ref Range: 0 - 32 IU/L 25   AST Latest Ref Range: 0 - 40 IU/L 33     Results for Jayshree Wiseman (MRN [id number]) as of 11/20/2020 15:26   Ref. Range 10/30/2020 16:48   Iron % saturation Latest Ref Range: 15 - 55 % 3 (LL)   UIBC Latest Ref Range: 131 - 425 ug/dL 384   Ferritin Latest Ref Range: 15 - 150 ng/mL 7 (L)     Results for Jayshree Wiseman (MRN [id number]) as of 11/20/2020 15:26   Ref. Range 10/30/2020 16:48   Triglyceride Latest Ref Range: 0 - 149 mg/dL 342 (H)   Cholesterol, total Latest Ref Range: 100 - 199 mg/dL 140   HDL Cholesterol Latest Ref Range: >39 mg/dL 33 (L)   LDL Cholesterol Latest Ref Range: 0 - 99 mg/dL 54     Assessment/Plan: This is a very pleasant 72-year-old female with past medical history significant for type 2 diabetes without known complication presenting in follow-up for continued discussion regarding medication management. Currently, patient is maximized on Metformin 1000 mg twice daily. She is also taking pioglitazone at 15 mg, and 70/30 insulin 50 units twice daily, which she increased from 40 units twice daily. Repeat hemoglobin A1c showed a significant increase following the last assessment. I have asked Shaq Fabi to see if her insurance will pay for the liraglutide, she can use a coupon if not. I explained in detail the fact that her fasting blood glucose goal is less than 140 and if that is not being met, we will need to increase the Lantus or increase the pioglitazone to 30.     #DMII currently poorly controlled with hemoglobin A1c 10.9%  -Urinary microalbumin creatinine ratio was normal this year  -Is planning on seeing ophthalmologist soon  -Drangahrauni 3, patient has previously tolerated this  -Continue Metformin 1000 mg twice daily  -Continue 50 units of 70/30 insulin twice daily-can down titrate by 5 units every day for each dose if blood glucose is less than 90  -Continue pioglitazone 15 mg daily, could increase to 30 mg daily  -Monitor carbohydrate ingestion  -Patient is taking a statin and in ARB    Copy sent to: Chiki Leggett MD    Return to care: Feb 12th at 3:10    Dayanna Oseguera is a 62 y.o. female being evaluated by a Virtual Visit (video visit) encounter to address concerns as mentioned above. A caregiver was present when appropriate. Due to this being a TeleHealth encounter (During [facility]-20 public health emergency), evaluation of the following organ systems was limited:     Vitals/Constitutional/EENT/Resp/CV/GI//MS/Neuro/Skin/Heme-Lymph-Imm. Pursuant to the emergency declaration under the [facility] [place], [address] [place] [address] authority and the [employer] Act, this Virtual Visit was conducted with patient's (and/or legal guardian's) consent, to reduce the risk of exposure to COVID-19 and provide necessary medical care. Services were provided through a video synchronous discussion virtually to substitute for in-person encounter. Molly Blood MD  [facility]     An electronic signature was used to authenticate this note.

## 2021-01-27 ENCOUNTER — DOCUMENTATION ONLY (OUTPATIENT)
Dept: ENDOCRINOLOGY | Age: 61
End: 2021-01-27

## 2021-03-03 ENCOUNTER — TELEPHONE (OUTPATIENT)
Dept: ENDOCRINOLOGY | Age: 61
End: 2021-03-03

## 2021-03-03 NOTE — TELEPHONE ENCOUNTER
Spoke with Shama Antoine from [employer] which is apart of [employer] and she stated that she spoke with Tammy at our office and was made aware that the office received the  medical records request but she was unable to tell her of the turnaround time. Shama Antoine was made aware that Loan Martin does not work for  [employer] but for the [employer] and that she would not have a way of knowing whether or not faxes were received for this pt. Shama Antoine was then made aware that we have not yet received any medical records request and asked if she could send the request to [phone]. She then asked if she could email me the request as well.     The email and fax was received

## 2021-03-03 NOTE — TELEPHONE ENCOUNTER
----- Message from Carolann Can sent at 3/3/2021  8:59 AM EST -----  Regarding: Dr. Hayden Score: [phone]  General Message/Vendor Calls    Caller's first and last name:Clarissa [id number] [facility]      Reason for Mirella Salmeron is calling to check on the status of the medical records request form that was sent on 2/24.       Callback required yes/no and why:Yes, confirm      Best contact number(s):[phone] reference number: [id number]      Details to clarify the request:n/a      Carolann Can

## 2021-03-11 ENCOUNTER — VIRTUAL VISIT (OUTPATIENT)
Dept: DIABETES SERVICES | Age: 61
End: 2021-03-11
Payer: MEDICAID

## 2021-03-11 DIAGNOSIS — E11.9 TYPE 2 DIABETES MELLITUS WITHOUT COMPLICATION, WITH LONG-TERM CURRENT USE OF INSULIN (HCC): ICD-10-CM

## 2021-03-11 DIAGNOSIS — Z79.4 TYPE 2 DIABETES MELLITUS WITHOUT COMPLICATION, WITH LONG-TERM CURRENT USE OF INSULIN (HCC): ICD-10-CM

## 2021-03-11 PROCEDURE — 97802 MEDICAL NUTRITION INDIV IN: CPT | Performed by: DIETITIAN, REGISTERED

## 2021-03-11 NOTE — PROGRESS NOTES
[facility]  Initial Nutrition Assessment    Patient's Name: Diane Grimes  Date: 3/11/2021  Reason for Referral:   Referral Source: Franci Yanez MD    Nutrition Assessment:  Pertinent Medical History:  Past Medical History:   Diagnosis Date    Anemia     Diabetes ([address]     Herpes genitalis     High cholesterol     Hypercholesteremia     Hypertension     Ill-defined condition     hyperlipidemia     Pertinent Medications/Supplements:  Prior to Admission medications    Medication Sig Start Date End Date Taking? Authorizing Provider   pioglitazone (ACTOS) 15 mg tablet TAKE 1 TABLET BY MOUTH EVERY DAY 2/21/21   Franci Yanez MD   insulin NPH/insulin regular (NovoLIN 70/30 U-100 Insulin) 100 unit/mL (70-30) injection 50 Units by SubCUTAneous route two (2) times a day for 90 days. Indications: type 2 diabetes mellitus 2/7/21 5/8/21  Franci Yanez MD   ferrous sulfate 325 mg (65 mg iron) tablet TAKE 1 TABLET BY MOUTH TWICE A DAY 11/4/20   Provider, Historical   liraglutide (VICTOZA) 0.6 mg/0.1 mL (18 mg/3 mL) pnij 0.6 mg daily for 7 days, THEN 1.2 mg daily for 7 days, THEN 1.8 mg daily for 200 days. 11/20/20 6/22/21  Franci Yanez MD   Insulin Needles, Disposable, 32 gauge x 5/32\" ndle 1 Each by Does Not Apply route daily. 11/20/20   Franci Yanez MD   omeprazole (PRILOSEC) 20 mg capsule TAKE 1 CAPSULE BY MOUTH EVERY DAY 9/4/20   Provider, Historical   silver sulfADIAZINE (SILVADENE) 1 % topical cream APPLY TO AFFECTED AREA TWICE A DAY 7/30/20   Provider, Historical   atorvastatin (LIPITOR) 20 mg tablet Take 1 Tab by mouth daily. 10/14/20   Loraine Isaacs MD   metFORMIN (GLUCOPHAGE) 1,000 mg tablet Take 1 Tab by mouth two (2) times daily (with meals).  9/11/20   Loraine Isaacs MD   Insulin Syringe-Needle U-100 (TRUEplus Insulin) 1 mL 31 gauge x 5/16 syrg Two shots daily 4/24/20   Loraine Isaacs MD   escitalopram oxalate (LEXAPRO) 10 mg tablet TAKE 1 TABLET BY MOUTH EVERY DAY 3/7/20   Provider, Historical   losartan (COZAAR) 100 mg tablet TAKE ONE TABLET BY MOUTH DAILY 7/3/17   Adriana Tobias MD   NOVOFINE 30 30 gauge x 1/3\" USE TO INJECT VICTOZA ONCE DAILY 3/20/17   Ramon Garcia MD   omeprazole (PRILOSEC) 40 mg capsule Take 1 Cap by mouth daily. 6/10/16   Ramon Garcia MD       Vitamin/mineral supplements: ferrous sulfate 325 mg daily until end of month(hx of anemia)    Pertinent Biochemical Data:  Lab Results   Component Value Date/Time    Sodium 137 10/30/2020 04:48 PM    Potassium 5.0 10/30/2020 04:48 PM    Chloride 102 10/30/2020 04:48 PM    CO2 19 (L) 10/30/2020 04:48 PM    Anion gap 10 12/03/2016 05:45 PM    Glucose 480 (H) 10/30/2020 04:48 PM    BUN 18 10/30/2020 04:48 PM    Creatinine 0.84 10/30/2020 04:48 PM    BUN/Creatinine ratio 21 10/30/2020 04:48 PM    GFR est AA 88 10/30/2020 04:48 PM    GFR est non-AA 76 10/30/2020 04:48 PM    Calcium 9.1 10/30/2020 04:48 PM    Bilirubin, total <0.2 03/24/2020 04:22 PM    Alk.  phosphatase 80 03/24/2020 04:22 PM    Protein, total 6.6 03/24/2020 04:22 PM    Albumin 4.0 03/24/2020 04:22 PM    Globulin 4.4 (H) 12/03/2016 05:45 PM    A-G Ratio 1.5 03/24/2020 04:22 PM    ALT (SGPT) 34 (H) 03/24/2020 04:22 PM    AST (SGOT) 47 (H) 03/24/2020 04:22 PM      Lab Results   Component Value Date/Time    Cholesterol, total 140 10/30/2020 04:48 PM    HDL Cholesterol 33 (L) 10/30/2020 04:48 PM    LDL, calculated 54 10/30/2020 04:48 PM    LDL, calculated 57 03/24/2020 04:22 PM    VLDL, calculated 53 (H) 10/30/2020 04:48 PM    VLDL, calculated 41 (H) 03/24/2020 04:22 PM    Triglyceride 342 (H) 10/30/2020 04:48 PM     Lab Results   Component Value Date/Time    Hemoglobin A1c 10.9 (H) 10/30/2020 04:48 PM    Hemoglobin A1c (POC) 7.6 03/28/2019 03:58 PM     Lab Results   Component Value Date/Time    Microalb/Creat ratio (ug/mg creat.) 10 03/24/2020 04:22 PM     BP Readings from Last 2 Encounters:   09/03/20 138/84   12/23/19 (!) 155/93 Home blood glucose records: SMBG Fasting- 140-277 mg/dL only testing once daily    Anthropometrics:  Height:   Ht Readings from Last 1 Encounters:   09/03/20 5' 4\" (1.626 m)     Weight:   Wt Readings from Last 1 Encounters:   09/03/20 204 lb (92.5 kg)     Adjusted body wt based on most recent wt: 144 lb or 65.4 kg     Food- and nutrition-related history:  Pt's perceptions, values, motivation, barriers r/t nutrition problem: knows she needs to change to eating more at home. Reports dining 4-5 days per week. Works near [employer] and usually goes home to let the dogs out at lunch. Trying to make changes. \"ridiculous amounts of eating and food choices\". Prior DSMES: dx 20 years ago and has seen RD and educators along the way. Food allergies: none  Eating environment/psychosocial/support: self; groceries and cooking by self. No food insecurity. Dining out on fast foods - pizza, burgers,   Knowledge/beliefs/attitudes about food/nutrition/health: has not carb counted. Shared that she will eat when stressed. Need to make changes to improve BG secondary to fear of complications. 24-hour recall:  Breakfast (at work): English muffin/bagel thin, 1 egg and 1-2 slice nelson  Lunch (1 pm) (thur/fri off at 1:30 pm): PB sandwich or leftovers or homemade burger and airfryer for french fries. Dinner   If cooking: Bonza pasta with canned paste and tomatoes/peppers/onion with ground beef/chicken and add asparagus. Dining out: [employer] - little with cheese, peppers, onions, lettuce and tomato eating 1/2 small order - water or seltzer  [place]- brown rice, pelletier/black, chicken, fajita vegetables, cheese, s.cream.   Pizza - [employer]:vegetarian or cheese hand toss - small 2-3 slices and seconds for next day. Snacks: peanuts with shell 1 package per week. Lifesavers. PB crackers - PB 1 package/2 day per week.    Beverages: water, seltzer, coffee - creamer in am   Fruit: midday or prior to bed: grapes, oranges/clementines/GS apples. Activity level:bike/walking = not currently doing. Estimated Nutrition Needs:  Calorie needs (using [place] Jeor x 1. 1AF - 500 kcal): 1200 -1300 kcal/day for wt loss    Nutrition Diagnosis: (3/11/2021): Food and nutrition related knowledge deficit related to consistent carbohydrates as evidenced by recall. Nutrition Interventions:  1) continue to eat 3 meals daily. 2) try to choose low calorie foods for snacking when stressed- options discussed. 3) prepare 1-2 meals per week - prep and freeze portions. 4) use frozen entrees as whole meals or side dishes - try to keep below 600 mg sodium per serving. 5) plan for about 45 g carbohydrate at a meal.  6) consider using rotisserie chicken/frozen fish for making quick meal and prepare more for leftovers. 7) continue to use pastas made with legumes/whole grains to help with post meal BG. Motivated to try carb counting and dosing insulin for foods versus using pre-mixed insulin dosing. For weight loss - may benefit from using long acting basal insulin and rapid acting for meals/correction. Patient Goals: improve BG to target and lose weight. Resources Provided/Reviewed: Carbohydrate meal planning     Nutrition Monitoring/Evaluation:  (3/11/2021): recall (labels and food information) and BG monitoring logs    MNT Follow-up Visit: 4/1/21 in-person visit. Jamari Beard [employer] for Telehealth:    Reji Coats is a 62 y.o. female being evaluated through a synchronous (real-time) audio-video technology platform, as a substitution for an in-person encounter, to address the healthcare issues mentioned above. A caregiver was present when appropriate. I was in the office. The patient was at home. The patient and/or her healthcare decision maker, is aware that this patient-initiated, Telehealth encounter on 3/11/2021 is a billable service, with coverage as determined by her insurance carrier. She is aware that she may receive a bill and has provided verbal consent to proceed: Yes. This telehealth encounter occurred during the COVID-19 pandemic and public health emergency. Evaluation of the following organ systems was limited: Vitals/Constitutional/EENT/Resp/CV/GI//MS/Neuro/Skin/Heme-Lymph-Imm. Pursuant to the emergency declaration under the [address] [place] [address] and the [employer] and [employer] Act, this Virtual Visit was conducted with patient's (and/or legal guardian's) consent, to reduce the risk of exposure to COVID-19 and provide necessary medical care. Natalia ORTIZ on 3/11/2021 at 2:32 PM    An electronic signature was used to authenticate this note.  I was in the office for the appointment and time spent: 60 minutes

## 2021-03-12 ENCOUNTER — VIRTUAL VISIT (OUTPATIENT)
Dept: ENDOCRINOLOGY | Age: 61
End: 2021-03-12
Payer: MEDICAID

## 2021-03-12 DIAGNOSIS — Z79.4 TYPE 2 DIABETES MELLITUS WITHOUT COMPLICATION, WITH LONG-TERM CURRENT USE OF INSULIN (HCC): Primary | ICD-10-CM

## 2021-03-12 DIAGNOSIS — Z79.4 TYPE 2 DIABETES MELLITUS WITHOUT COMPLICATION, WITH LONG-TERM CURRENT USE OF INSULIN (HCC): ICD-10-CM

## 2021-03-12 DIAGNOSIS — E11.9 TYPE 2 DIABETES MELLITUS WITHOUT COMPLICATION, WITH LONG-TERM CURRENT USE OF INSULIN (HCC): ICD-10-CM

## 2021-03-12 DIAGNOSIS — E11.9 TYPE 2 DIABETES MELLITUS WITHOUT COMPLICATION, WITH LONG-TERM CURRENT USE OF INSULIN (HCC): Primary | ICD-10-CM

## 2021-03-12 PROCEDURE — 99214 OFFICE O/P EST MOD 30 MIN: CPT | Performed by: INTERNAL MEDICINE

## 2021-03-12 NOTE — PROGRESS NOTES
Chief Complaint   Patient presents with    Diabetes     pcp and pharmacy verified. DOXY. [phone] (M)       History of Present Illness: Alanna Morel is a 62 y.o. female with a past medical hx significant for HTN, HLD, anemia, BMI35 presenting in follow up for continued discussion regarding diabetes mellitus. Last visit with Billie in 03/2020:  1) Her A1C yesterday was 9.2%. We discussed the need to decrease her carb intake and increase her physical activity. Pt notes the Levemir and Victoza are a financial strain on her now. We discussed trying Novolog 70/30, 40 units in the AM and 40 units with dinner to help improve her BGs. I sent the prescription to her pharmacy and pt will price this out. Pt to check her BGs 3 time per day and send me BG logs in a month. Previously:  40U 70/30 is doing pretty well for pt. Admits to dietary indiscretion- pasta, carbs. Lives [place] mins away from [place] [address] at [place]. Brother got her a recumbent bike. Sister and oldest brother have hemochromatosis. Daughter is working in [address] [place] [address] with environmental services. Is motivated to have a better diet for when her daughter gets home from [address] [place] [address]. 11/20/2020:  Bought a Vantrixr glucometer- BG was 343,301, high 200s, 436- late afternoon- also high in the morning. Is feeling better overall with iron supplementation. [facility] brand meter 192 this AM. Increased insulin to 50U and it is a little better. Did eat lo mein last night for dinner. Daughter surprised her on Wednesday. Had chinese doughnuts for breakfast.  Found out she is a carrier for hemochromatosis. Is taking vitamin C and ferrous sulfate twice daily. Has a history of polyps on colonoscopy. Weight is between 205 and 208. Previously was on victoza. Now that she has more energy, is planning on exercising more with the recumbent bike that her brother daughter. Deisy previously was taking Victoza, has an old supply.   She was told to discontinue this medication at some point. 2021: Daughter just went back to [facility]. Had a good [employer]. Has tried to stay away from the candy at work. Also wants to talk with a psychologist. Insurance just changed. Was not able to afford liraglutide. 2021: Slipped on ice a few weeks ago and fell outo f bed. Swollen in the face. Had visit with diabetes educator, had a good chat yesterday afternoon. Coming into the office in person in 3 weeks. Discussed carbs, how many she should be eating per meal. Forgot to take insulin this AM. 137 at 245, 217 earlier in the day. Few really bad ones- 378 this AM- in the 200s, 304 one day ate pudding and jello, 286, 298, 300, 290. This this in going to work her working with the dietician. Has gained 25 lbs in the past 1 year. Daughters coming back in Sept.     Diabetes Mellitus Type II    * Diagnosed (year): Gestational diabetes 23 years ago   * Last Hb A1C: Results for Jayshree Wiseman (MRN [id number]) as of 2020 15:26   Ref. Range 10/30/2020 16:48   Hemoglobin A1c, (calculated) Latest Ref Range: 4.8 - 5.6 % 10.9 (H)        - Current DM medications:    - Orals: metformin 1000mg BID, pioglitazone 15mg every day    - Injectables: 50U BID 70/30    - Monitors glucose: Twice daily   Examples (range):   previous   - fastin this AM,220,185,150,191,239,205,132 90 beginning of  before new year (had eaten mac n cheese)    - History of hypoglycemia: rarely in the [de-identified]    - Hospitalized for DM: none      Diabetes-related complications:    * Nephropathy: Results for Jayshree Wiseman (MRN [id number]) as of 10/30/2020 15:49   Ref. Range 2019 14:55   Microalbumin/Creat.  Ratio Latest Ref Range: 0.0 - 30.0 mg/g creat 15.8      * Retinopathy: >1 year ago, needs to get a hold of him   * Neuropathy: none   * Autonomic dysfunction: none   * History of amputations or foot ulcers: none     Lifestyle:    24-hour diet history:    2 meals/day 1 snacks/day   * Breakfast: Went to [employer] and had flatbread cheese pizza   * Lunch: Lean cuisine herb rené henson    * Dinner: Peanut butter jelly sandwich 3 pieces of bread   * Snacks:  Lifesavers mints mariola o green, shelled peanuts   * Desserts:    * Drinks: No soda- h20 or seltzer    2019QI  Exercise:  Recumbent bike     Support and Resources:   - Visit with dietician in the last 2 years: yes, many times    Current Outpatient Medications   Medication Sig    pioglitazone (ACTOS) 15 mg tablet TAKE 1 TABLET BY MOUTH EVERY DAY    insulin NPH/insulin regular (NovoLIN 70/30 U-100 Insulin) 100 unit/mL (70-30) injection 50 Units by SubCUTAneous route two (2) times a day for 90 days. Indications: type 2 diabetes mellitus    ferrous sulfate 325 mg (65 mg iron) tablet TAKE 1 TABLET BY MOUTH TWICE A DAY    Insulin Needles, Disposable, 32 gauge x 5/32\" ndle 1 Each by Does Not Apply route daily.  omeprazole (PRILOSEC) 20 mg capsule TAKE 1 CAPSULE BY MOUTH EVERY DAY    silver sulfADIAZINE (SILVADENE) 1 % topical cream APPLY TO AFFECTED AREA TWICE A DAY    atorvastatin (LIPITOR) 20 mg tablet Take 1 Tab by mouth daily.  metFORMIN (GLUCOPHAGE) 1,000 mg tablet Take 1 Tab by mouth two (2) times daily (with meals).  Insulin Syringe-Needle U-100 (TRUEplus Insulin) 1 mL 31 gauge x 5/16 syrg Two shots daily    losartan (COZAAR) 100 mg tablet TAKE ONE TABLET BY MOUTH DAILY    NOVOFINE 30 30 gauge x 1/3\" USE TO INJECT VICTOZA ONCE DAILY    escitalopram oxalate (LEXAPRO) 10 mg tablet TAKE 1 TABLET BY MOUTH EVERY DAY    omeprazole (PRILOSEC) 40 mg capsule Take 1 Cap by mouth daily. No current facility-administered medications for this visit.       Review of Systems:  - Eyes: no blurry vision or double vision  - Cardiovascular: no chest pain  - Respiratory: no shortness of breath  - Musculoskeletal: L shoulder pain following fall  - Neurological: no numbness/tingling in extremities    Physical Examination:  - GENERAL: NCAT, Appears well nourished   - EYES: EOMI, non-icteric, no proptosis   - Ear/Nose/Throat: NCAT, no visible inflammation or masses   - CARDIOVASCULAR: no cyanosis, no visible JVD   - RESPIRATORY: respiratory effort normal without any distress or labored breathing   - MUSCULOSKELETAL: Normal ROM of neck and upper extremities observed   - SKIN: No rash on face  - NEUROLOGIC:  No facial asymmetry (Cranial nerve 7 motor function), No gaze palsy   - PSYCHIATRIC: Normal affect, Normal insight and judgement     Data Reviewed:   Results for Chau Thompson (MRN [id number]) as of 10/30/2020 15:49   Ref. Range 9/12/2019 14:55   ALT Latest Ref Range: 0 - 32 IU/L 25   AST Latest Ref Range: 0 - 40 IU/L 33     Results for Chau Thompson (MRN [id number]) as of 11/20/2020 15:26   Ref. Range 10/30/2020 16:48   Iron % saturation Latest Ref Range: 15 - 55 % 3 (LL)   UIBC Latest Ref Range: 131 - 425 ug/dL 384   Ferritin Latest Ref Range: 15 - 150 ng/mL 7 (L)     Results for Chau Thompson (MRN [id number]) as of 11/20/2020 15:26   Ref. Range 10/30/2020 16:48   Triglyceride Latest Ref Range: 0 - 149 mg/dL 342 (H)   Cholesterol, total Latest Ref Range: 100 - 199 mg/dL 140   HDL Cholesterol Latest Ref Range: >39 mg/dL 33 (L)   LDL Cholesterol Latest Ref Range: 0 - 99 mg/dL 54     Assessment/Plan: This is a very pleasant 64-year-old female with past medical history significant for type 2 diabetes without known complication presenting in follow-up for continued discussion regarding medication management. Currently, patient is maximized on Metformin 1000 mg twice daily. She is also taking pioglitazone at 15 mg, and 70/30 insulin 50 units twice daily, which she increased from 40 units twice daily. Unfortunately, since our last visit, best blood glucose control has worsened further to a 200s and low 300s.   She wants to try to adjust her dietary consumption of carbohydrates and recently met with our diabetes educator rather than increase insulin. We will repeat her labs in 3 months. #DMII currently poorly controlled with hemoglobin A1c 10.9%  -Urinary microalbumin creatinine ratio was normal this year  -Is planning on seeing ophthalmologist soon  -Continue Metformin 1000 mg twice daily  -Continue 50 units of 70/30 insulin twice daily  -Continue pioglitazone 15 mg daily, could increase to 30 mg daily  -Monitor carbohydrate ingestion  -Patient is taking a statin and in ARB    Copy sent to: Marlen Erickson MD    Return to care: June 17th at 2:30    Promise Michelle is a 62 y.o. female being evaluated by a Virtual Visit (video visit) encounter to address concerns as mentioned above. A caregiver was present when appropriate. Due to this being a TeleHealth encounter (During [facility] public health emergency), evaluation of the following organ systems was limited:     Vitals/Constitutional/EENT/Resp/CV/GI//MS/Neuro/Skin/Heme-Lymph-Imm. Pursuant to the emergency declaration under the [address] [place] [address], [address] [place] [address] authority and the [employer] and [employer] Act, this Virtual Visit was conducted with patient's (and/or legal guardian's) consent, to reduce the risk of exposure to COVID-19 and provide necessary medical care. Services were provided through a video synchronous discussion virtually to substitute for in-person encounter. Roberto Palacios MD  [facility]     An electronic signature was used to authenticate this note.

## 2021-04-01 ENCOUNTER — CLINICAL SUPPORT (OUTPATIENT)
Dept: DIABETES SERVICES | Age: 61
End: 2021-04-01
Payer: MEDICAID

## 2021-04-01 DIAGNOSIS — E11.9 TYPE 2 DIABETES MELLITUS WITHOUT COMPLICATION, WITH LONG-TERM CURRENT USE OF INSULIN (HCC): Primary | ICD-10-CM

## 2021-04-01 DIAGNOSIS — Z79.4 TYPE 2 DIABETES MELLITUS WITHOUT COMPLICATION, WITH LONG-TERM CURRENT USE OF INSULIN (HCC): Primary | ICD-10-CM

## 2021-04-01 PROCEDURE — 97803 MED NUTRITION INDIV SUBSEQ: CPT

## 2021-04-01 NOTE — PROGRESS NOTES
[facility]  Follow-up Nutrition Appointment    Patient's Name: Bernabe Cooper  Date: 2021  Reason for Referral: DM education with focus on meal planning  Referral Source: Breanna Johnson MD    Pt was seen today for a face to face visit in office. Nutrition Assessment  Pertinent Medical History:  Past Medical History:   Diagnosis Date    Anemia     Diabetes ([address]     Herpes genitalis     High cholesterol     Hypercholesteremia     Hypertension     Ill-defined condition     hyperlipidemia     Patient Active Problem List   Diagnosis Code    Diabetes ([address] E11.9    HTN (hypertension) I10    Hyperlipidemia E78.5    Knee pain M25.569    Severe obesity ([address] E66.01    Anemia D64.9       New Pertinent Medications/Supplements:  Discontinued Ferrous Sulfate,    New Pertinent Biochemical Data:  Not reviewed    Home blood glucose records: SMBG 3-4x/day, Range of readings- fastin-135mg/dL, prior to lunch:  mg/dl and piror to dinner/ after work:170-214 mg/dl    Anthropometrics:  Height:   Ht Readings from Last 1 Encounters:   20 5' 4\" (1.626 m)     Weight:   Wt Readings from Last 1 Encounters:   20 204 lb (92.5 kg)     See note dated 3/11/21 for adjusted body weight    Food and nutrition related interventions initiated and implemented since last visit:  Decreased her frequency of dining out to <2 times a week. She shared she has started eating 3 meals consistently. Finds using frozen and convenience foods easier to make wise food choices and is paying attention to sodium. Trying new pastas - working towards Standard [place] them down\". Deisy has started using My Fitness Pal to track her foods and finds that this helps her to make timely decisions whether she make the food choice or not. Activity level: trying to walk 250 steps each hour on work days.     Pt's progress on previous goals: monitoring logs - completed and reviewed; did no have time to review food diary due to patient's questions. Estimated Nutrition Needs:  Calorie needs (using [place] [address] Jeor x 1. 1AF - 500 kcal): 1200 -1300 kcal/day for wt loss     Nutrition Diagnosis: (3/11/2021): Food and nutrition related knowledge deficit related to consistent carbohydrates as evidenced by recall.     Nutrition Intervention:  1) try to stick with the calories assessed by myfitnesspal ciara as it is consistent with my estimate for daily needs to support wt loss. 2) do not add kcal back into your meals for exercise/activity kcals spent. 3) continue to limit dining out   4) may wish to consider breaking up LifeSavers into smaller pieces so you can decrease the amount consumed and then decrease hyperglycemia. 5) pick a day to start exercise - chair based activity along with your \"bike\" would give you flexibility with reducing affects of pain on BG. Gradually working towards 150 minutes/week for health. 6) have a plan to address low BG should occur with exercise - may need to ask for decreases to medication doses. Nutrition Monitoring/Evaluation:  My FitnessPal ciara and her BG diary. MNT Follow-up Visit: 4/23/21    Collette Chain RD CDCES on 4/1/2021 at 4:01 PM    An electronic signature was used to authenticate this note.  I was in the office for the appointment and time spent: 60 minutes

## 2021-04-27 ENCOUNTER — TELEPHONE (OUTPATIENT)
Dept: ENDOCRINOLOGY | Age: 61
End: 2021-04-27

## 2021-04-27 NOTE — TELEPHONE ENCOUNTER
Called the pt to make her aware that her pt assistance for NovoNordic was denied. She replied by saying \"I knew that. \"  Then she stated that she sent a [employer] message this morning making Dr Hai Asif aware that her Clint Brow will cost her over $600 a month and she is unable to afford the medication. Pt was made aware that Dr Hai Asif was out of the office and will not be returning until May 10. She then stated that she called the scheduling line and was made aware that Dr Hai Asif was working at [facility] today  and was currently seeing pts. Pt was made aware that the information that was given to her was incorrect and that her provider was not in the office today. She then repeated what was told to her 2 more times. After explaining to the pt that I was her provider's nurse and that she provider was not working she then abruptly d/c the call.

## 2021-05-06 RX ORDER — CALCIUM CARB/VITAMIN D3/VIT K1 500-100-40
TABLET,CHEWABLE ORAL
Qty: 50 SYRINGE | Refills: 23 | Status: SHIPPED | OUTPATIENT
Start: 2021-05-06 | End: 2022-05-16

## 2021-05-10 RX ORDER — SEMAGLUTIDE 1.34 MG/ML
INJECTION, SOLUTION SUBCUTANEOUS
Qty: 3 PEN | Refills: 0 | Status: SHIPPED | OUTPATIENT
Start: 2021-05-10 | End: 2021-08-06

## 2021-05-12 ENCOUNTER — DOCUMENTATION ONLY (OUTPATIENT)
Dept: ENDOCRINOLOGY | Age: 61
End: 2021-05-12

## 2021-06-11 LAB
ALBUMIN/CREAT UR: 52 MG/G CREAT (ref 0–29)
BUN SERPL-MCNC: 19 MG/DL (ref 8–27)
BUN/CREAT SERPL: 23 (ref 12–28)
CALCIUM SERPL-MCNC: 9.6 MG/DL (ref 8.7–10.3)
CHLORIDE SERPL-SCNC: 99 MMOL/L (ref 96–106)
CHOLEST SERPL-MCNC: 142 MG/DL (ref 100–199)
CO2 SERPL-SCNC: 22 MMOL/L (ref 20–29)
CREAT SERPL-MCNC: 0.81 MG/DL (ref 0.57–1)
CREAT UR-MCNC: 21.6 MG/DL
EST. AVERAGE GLUCOSE BLD GHB EST-MCNC: 235 MG/DL
GLUCOSE SERPL-MCNC: 229 MG/DL (ref 65–99)
HBA1C MFR BLD: 9.8 % (ref 4.8–5.6)
HDLC SERPL-MCNC: 34 MG/DL
LDLC SERPL CALC-MCNC: 82 MG/DL (ref 0–99)
MICROALBUMIN UR-MCNC: 11.3 UG/ML
POTASSIUM SERPL-SCNC: 4.2 MMOL/L (ref 3.5–5.2)
SODIUM SERPL-SCNC: 136 MMOL/L (ref 134–144)
TRIGL SERPL-MCNC: 150 MG/DL (ref 0–149)
VLDLC SERPL CALC-MCNC: 26 MG/DL (ref 5–40)

## 2021-06-15 NOTE — LETTER
6/15/2021 Ms. [address] [place] [address] Mariana Phillip 7 [phone] Dear Elizabeth Daly: 
 
Please find your most recent results below. Resulted Orders HEMOGLOBIN A1C WITH EAG Result Value Ref Range Hemoglobin A1c 9.8 (H) 4.8 - 5.6 % Estimated average glucose 235 mg/dL Narrative Performed at:  [address]  [id number] : Arnoldo Osorio MD, Phone:  [phone] LIPID PANEL Result Value Ref Range Cholesterol, total 142 100 - 199 mg/dL Triglyceride 150 (H) 0 - 149 mg/dL HDL Cholesterol 34 (L) >39 mg/dL VLDL, calculated 26 5 - 40 mg/dL LDL, calculated 82 0 - 99 mg/dL Narrative Performed at:  [address]  [id number] : Arnoldo Osorio MD, Phone:  [phone] METABOLIC PANEL, BASIC Result Value Ref Range Glucose 229 (H) 65 - 99 mg/dL BUN 19 8 - 27 mg/dL Creatinine 0.81 0.57 - 1.00 mg/dL GFR est non-AA 79 >59 mL/min/1.73 GFR est AA 91 >59 mL/min/1.73  
 BUN/Creatinine ratio 23 12 - 28 Sodium 136 134 - 144 mmol/L Potassium 4.2 3.5 - 5.2 mmol/L Chloride 99 96 - 106 mmol/L  
 CO2 22 20 - 29 mmol/L Calcium 9.6 8.7 - 10.3 mg/dL Narrative Performed at:  [address]  [id number] : Arnoldo Osorio MD, Phone:  [phone] MICROALBUMIN, UR, RAND W/ MICROALB/CREAT RATIO Result Value Ref Range Creatinine, urine 21.6 Not Estab. mg/dL Microalbumin, urine 11.3 Not Estab. ug/mL Microalb/Creat ratio (ug/mg creat.) 52 (H) 0 - 29 mg/g creat Narrative Performed at:  [address]  [id number] : Arnoldo Osorio MD, Phone:  [phone] RECOMMENDATIONS: 
 
Hi Deisy, There has been an improvement in your HbA1c; we are finally down below 10.  There is protein in your urine so we do need to continue to work towards an HbA1c of 7.0% or less. Your triglycerides are just 1 point above normal and your good cholesterol or HDL is a touch low. Electrolytes and kidney function tests are normal.  
 
Please call me if you have any questions: [phone] Sincerely, 
 
 
Shima Cuellar MD

## 2021-07-08 ENCOUNTER — TRANSCRIBE ORDER (OUTPATIENT)
Dept: GENERAL RADIOLOGY | Age: 61
End: 2021-07-08

## 2021-07-08 ENCOUNTER — HOSPITAL ENCOUNTER (OUTPATIENT)
Dept: MAMMOGRAPHY | Age: 61
Discharge: HOME | End: 2021-07-08
Payer: COMMERCIAL

## 2021-07-08 DIAGNOSIS — Z12.31 SCREENING MAMMOGRAM, ENCOUNTER FOR: Primary | ICD-10-CM

## 2021-07-08 DIAGNOSIS — Z12.31 SCREENING MAMMOGRAM, ENCOUNTER FOR: ICD-10-CM

## 2021-07-08 PROCEDURE — 77067 SCR MAMMO BI INCL CAD: CPT

## 2021-09-03 ENCOUNTER — OFFICE VISIT (OUTPATIENT)
Dept: OBGYN CLINIC | Age: 61
End: 2021-09-03
Payer: MEDICAID

## 2021-09-03 VITALS
DIASTOLIC BLOOD PRESSURE: 82 MMHG | SYSTOLIC BLOOD PRESSURE: 122 MMHG | BODY MASS INDEX: 36.02 KG/M2 | WEIGHT: 211 LBS | HEIGHT: 64 IN

## 2021-09-03 DIAGNOSIS — Z01.419 ENCOUNTER FOR ANNUAL ROUTINE GYNECOLOGICAL EXAMINATION: Primary | ICD-10-CM

## 2021-09-03 PROCEDURE — 99396 PREV VISIT EST AGE 40-64: CPT | Performed by: OBSTETRICS & GYNECOLOGY

## 2021-09-08 LAB
CYTOLOGIST CVX/VAG CYTO: NORMAL
CYTOLOGY CVX/VAG DOC CYTO: NORMAL
CYTOLOGY CVX/VAG DOC THIN PREP: NORMAL
DX ICD CODE: NORMAL
LABCORP, 190119: NORMAL
Lab: NORMAL
Lab: NORMAL
OTHER STN SPEC: NORMAL
STAT OF ADQ CVX/VAG CYTO-IMP: NORMAL

## 2021-09-20 ENCOUNTER — TELEPHONE (OUTPATIENT)
Dept: ENDOCRINOLOGY | Age: 61
End: 2021-09-20

## 2021-09-20 DIAGNOSIS — E11.9 TYPE 2 DIABETES MELLITUS WITHOUT COMPLICATION, WITH LONG-TERM CURRENT USE OF INSULIN (HCC): Primary | ICD-10-CM

## 2021-09-20 DIAGNOSIS — Z79.4 TYPE 2 DIABETES MELLITUS WITHOUT COMPLICATION, WITH LONG-TERM CURRENT USE OF INSULIN (HCC): Primary | ICD-10-CM

## 2021-09-20 DIAGNOSIS — Z79.4 TYPE 2 DIABETES MELLITUS WITHOUT COMPLICATION, WITH LONG-TERM CURRENT USE OF INSULIN (HCC): ICD-10-CM

## 2021-09-20 DIAGNOSIS — E11.9 TYPE 2 DIABETES MELLITUS WITHOUT COMPLICATION, WITH LONG-TERM CURRENT USE OF INSULIN (HCC): ICD-10-CM

## 2021-09-20 RX ORDER — METFORMIN HYDROCHLORIDE 1000 MG/1
1000 TABLET ORAL 2 TIMES DAILY WITH MEALS
Qty: 180 TABLET | Refills: 3 | Status: SHIPPED | OUTPATIENT
Start: 2021-09-20

## 2021-09-20 NOTE — TELEPHONE ENCOUNTER
Labs are ordered. I sent a message to [address] [place] [address] this AM because Brea Armstrong gave me a list of patients whose insurance [facility] was no longer accepting and Мария Douglas was one of them. I'm not sure if those patients have been notified yet. I'm happy to call her once labs return.     Mark Peraza, [facility]

## 2021-09-20 NOTE — TELEPHONE ENCOUNTER
Mrs Marianna Driscoll wanted to know if she can do her labs and possibly get a call from Dr Aure Pantoja before she goes out on leave. She also wants to know if she can see Dr Zamzam Olivares while she is out on leave.

## 2021-09-21 NOTE — TELEPHONE ENCOUNTER
Pt was notified of message per Dr Aure Pantoja and she voiced understanding of what was read to her.

## 2021-09-22 LAB
ALBUMIN/CREAT UR: 37 MG/G CREAT (ref 0–29)
CHOLEST SERPL-MCNC: 142 MG/DL (ref 100–199)
CREAT UR-MCNC: 64.6 MG/DL
EST. AVERAGE GLUCOSE BLD GHB EST-MCNC: 286 MG/DL
HBA1C MFR BLD: 11.6 % (ref 4.8–5.6)
HDLC SERPL-MCNC: 34 MG/DL
LDLC SERPL CALC-MCNC: 80 MG/DL (ref 0–99)
MICROALBUMIN UR-MCNC: 23.7 UG/ML
TRIGL SERPL-MCNC: 162 MG/DL (ref 0–149)
VLDLC SERPL CALC-MCNC: 28 MG/DL (ref 5–40)

## 2021-09-24 ENCOUNTER — TELEPHONE (OUTPATIENT)
Dept: ENDOCRINOLOGY | Age: 61
End: 2021-09-24

## 2021-09-24 DIAGNOSIS — E11.9 TYPE 2 DIABETES MELLITUS WITHOUT COMPLICATION, WITH LONG-TERM CURRENT USE OF INSULIN (HCC): Primary | ICD-10-CM

## 2021-09-24 DIAGNOSIS — Z79.4 TYPE 2 DIABETES MELLITUS WITHOUT COMPLICATION, WITH LONG-TERM CURRENT USE OF INSULIN (HCC): Primary | ICD-10-CM

## 2021-09-24 NOTE — TELEPHONE ENCOUNTER
Reviewed HbA1c results with Deisy along with microalbumin/creat. Deisy reports that she's eating \"12x better\" in the past week after seeing labs- fasting BG in the mid 100s. Weight is 211 at this point, BP is up. 52U BID of novolin 70/30 (buys OTC at [facility])  Actos 15mg, metformin 1000mg BID. Was started on amlodipine by PCP at night. Diet is main management strategy here.      Beverly Patrick, [facility]

## 2022-01-24 DIAGNOSIS — E11.9 TYPE 2 DIABETES MELLITUS WITHOUT COMPLICATION, WITH LONG-TERM CURRENT USE OF INSULIN (HCC): ICD-10-CM

## 2022-01-24 DIAGNOSIS — Z79.4 TYPE 2 DIABETES MELLITUS WITHOUT COMPLICATION, WITH LONG-TERM CURRENT USE OF INSULIN (HCC): ICD-10-CM

## 2022-02-14 RX ORDER — PIOGLITAZONEHYDROCHLORIDE 15 MG/1
TABLET ORAL
Qty: 90 TABLET | Refills: 3 | Status: SHIPPED | OUTPATIENT
Start: 2022-02-14

## 2022-03-19 PROBLEM — E66.01 SEVERE OBESITY (HCC): Status: ACTIVE | Noted: 2020-09-03

## 2022-04-20 LAB
ALBUMIN/CREAT UR: 22 MG/G CREAT (ref 0–29)
CHOLEST SERPL-MCNC: 150 MG/DL (ref 100–199)
CREAT UR-MCNC: 30.3 MG/DL
EST. AVERAGE GLUCOSE BLD GHB EST-MCNC: 269 MG/DL
HBA1C MFR BLD: 11 % (ref 4.8–5.6)
HDLC SERPL-MCNC: 34 MG/DL
LDLC SERPL CALC-MCNC: 83 MG/DL (ref 0–99)
MICROALBUMIN UR-MCNC: 6.7 UG/ML
TRIGL SERPL-MCNC: 192 MG/DL (ref 0–149)
VLDLC SERPL CALC-MCNC: 33 MG/DL (ref 5–40)

## 2022-04-25 NOTE — LETTER
4/25/2022    Ms. Dumont [id number]      Dear Tate Baker:    Please find your most recent results below. Resulted Orders   HEMOGLOBIN A1C WITH EAG   Result Value Ref Range    Hemoglobin A1c 11.0 (H) 4.8 - 5.6 %    Estimated average glucose 269 mg/dL    Narrative    Performed at:  [address]  [id number]  : Ron Fox MD, Phone:  [phone]   LIPID PANEL   Result Value Ref Range    Cholesterol, total 150 100 - 199 mg/dL    Triglyceride 192 (H) 0 - 149 mg/dL    HDL Cholesterol 34 (L) >39 mg/dL    VLDL, calculated 33 5 - 40 mg/dL    LDL, calculated 83 0 - 99 mg/dL    Narrative    Performed at:  [address]  [id number]  : Ron Fox MD, Phone:  [phone]   MICROALBUMIN, UR, RAND W/ MICROALB/CREAT RATIO   Result Value Ref Range    Creatinine, urine 30.3 Not Estab. mg/dL    Microalbumin, urine 6.7 Not Estab. ug/mL    Microalb/Creat ratio (ug/mg creat.) 22 0 - 29 mg/g creat    Narrative    Performed at:  [address]  [id number]  : Ron Fox MD, Phone:  [phone]       RECOMMENDATIONS:    Anni Vences,    Your HbA1c remains above our goal of 7.0% or less. There is no protein in your urine from the diabetes, fortunately. Your LDL cholesterol is close to the goal of 70 or less. I received your my-chart message. First of all I'm sorry to hear about your brother. I hope he continues to improve from the aphasia standpoint. Diabetes increase the risk of stroke as you know; for this reason it is very important to attempt to obtain an HbA1c of 7.0% or less to reduce the risk of both heart attack and or stroke. I am happy to put in 6 month refills but I cannot continue to care for you unfortunately if we aren't able to see each other in clinic.  I apologize for the confusion surrounding your insurance and our office.      Please call me if you have any questions: [phone]    Sincerely,      Jenaro Sinclair MD

## 2023-01-04 RX ORDER — PIOGLITAZONEHYDROCHLORIDE 15 MG/1
TABLET ORAL
Qty: 30 TABLET | Refills: 11 | OUTPATIENT
Start: 2023-01-04

## 2023-05-25 RX ORDER — LOSARTAN POTASSIUM 100 MG/1
1 TABLET ORAL DAILY
COMMUNITY
Start: 2017-07-03

## 2023-05-25 RX ORDER — ATORVASTATIN CALCIUM 20 MG/1
1 TABLET, FILM COATED ORAL DAILY
COMMUNITY
Start: 2021-12-14

## 2023-05-25 RX ORDER — PIOGLITAZONEHYDROCHLORIDE 15 MG/1
1 TABLET ORAL DAILY
COMMUNITY
Start: 2022-02-14
